# Patient Record
Sex: MALE | Race: WHITE | NOT HISPANIC OR LATINO | Employment: FULL TIME | URBAN - METROPOLITAN AREA
[De-identification: names, ages, dates, MRNs, and addresses within clinical notes are randomized per-mention and may not be internally consistent; named-entity substitution may affect disease eponyms.]

---

## 2023-03-09 DIAGNOSIS — I25.10 CORONARY ARTERY DISEASE INVOLVING NATIVE HEART, UNSPECIFIED VESSEL OR LESION TYPE, UNSPECIFIED WHETHER ANGINA PRESENT: Primary | ICD-10-CM

## 2023-03-09 RX ORDER — ATORVASTATIN CALCIUM 80 MG/1
TABLET, FILM COATED ORAL
COMMUNITY
End: 2023-03-09 | Stop reason: SDUPTHER

## 2023-03-09 RX ORDER — ATORVASTATIN CALCIUM 80 MG/1
80 TABLET, FILM COATED ORAL DAILY
Qty: 90 TABLET | Refills: 1 | Status: SHIPPED | OUTPATIENT
Start: 2023-03-09 | End: 2023-09-06

## 2023-03-09 RX ORDER — CLOPIDOGREL BISULFATE 75 MG/1
TABLET ORAL
COMMUNITY
End: 2023-03-09 | Stop reason: SDUPTHER

## 2023-03-09 RX ORDER — CLOPIDOGREL BISULFATE 75 MG/1
75 TABLET ORAL DAILY
Qty: 90 TABLET | Refills: 1 | Status: SHIPPED | OUTPATIENT
Start: 2023-03-09 | End: 2023-09-06

## 2023-05-04 ENCOUNTER — LAB (OUTPATIENT)
Dept: LAB | Facility: LAB | Age: 73
End: 2023-05-04
Payer: COMMERCIAL

## 2023-05-04 ENCOUNTER — OFFICE VISIT (OUTPATIENT)
Dept: PRIMARY CARE | Facility: CLINIC | Age: 73
End: 2023-05-04
Payer: COMMERCIAL

## 2023-05-04 VITALS
SYSTOLIC BLOOD PRESSURE: 124 MMHG | HEIGHT: 69 IN | WEIGHT: 197 LBS | BODY MASS INDEX: 29.18 KG/M2 | DIASTOLIC BLOOD PRESSURE: 80 MMHG

## 2023-05-04 DIAGNOSIS — I10 PRIMARY HYPERTENSION: ICD-10-CM

## 2023-05-04 DIAGNOSIS — Z00.00 HEALTHCARE MAINTENANCE: ICD-10-CM

## 2023-05-04 DIAGNOSIS — F41.9 ANXIETY: ICD-10-CM

## 2023-05-04 DIAGNOSIS — I25.10 CORONARY ARTERY DISEASE INVOLVING NATIVE CORONARY ARTERY OF NATIVE HEART WITHOUT ANGINA PECTORIS: ICD-10-CM

## 2023-05-04 DIAGNOSIS — E78.2 MIXED HYPERLIPIDEMIA: ICD-10-CM

## 2023-05-04 DIAGNOSIS — Z00.00 HEALTHCARE MAINTENANCE: Primary | ICD-10-CM

## 2023-05-04 DIAGNOSIS — Z00.00 ENCOUNTER FOR PREVENTIVE HEALTH EXAMINATION: ICD-10-CM

## 2023-05-04 DIAGNOSIS — Z00.00 HEALTH CARE MAINTENANCE: ICD-10-CM

## 2023-05-04 LAB
ALANINE AMINOTRANSFERASE (SGPT) (U/L) IN SER/PLAS: 29 U/L (ref 10–52)
ALBUMIN (G/DL) IN SER/PLAS: 4.5 G/DL (ref 3.4–5)
ALKALINE PHOSPHATASE (U/L) IN SER/PLAS: 84 U/L (ref 33–136)
ANION GAP IN SER/PLAS: 13 MMOL/L (ref 10–20)
ASPARTATE AMINOTRANSFERASE (SGOT) (U/L) IN SER/PLAS: 19 U/L (ref 9–39)
BILIRUBIN TOTAL (MG/DL) IN SER/PLAS: 0.8 MG/DL (ref 0–1.2)
CALCIUM (MG/DL) IN SER/PLAS: 10.2 MG/DL (ref 8.6–10.6)
CARBON DIOXIDE, TOTAL (MMOL/L) IN SER/PLAS: 30 MMOL/L (ref 21–32)
CHLORIDE (MMOL/L) IN SER/PLAS: 108 MMOL/L (ref 98–107)
CHOLESTEROL (MG/DL) IN SER/PLAS: 148 MG/DL (ref 0–199)
CHOLESTEROL IN HDL (MG/DL) IN SER/PLAS: 48.2 MG/DL
CHOLESTEROL/HDL RATIO: 3.1
CREATININE (MG/DL) IN SER/PLAS: 1.04 MG/DL (ref 0.5–1.3)
ERYTHROCYTE DISTRIBUTION WIDTH (RATIO) BY AUTOMATED COUNT: 12.7 % (ref 11.5–14.5)
ERYTHROCYTE MEAN CORPUSCULAR HEMOGLOBIN CONCENTRATION (G/DL) BY AUTOMATED: 33.1 G/DL (ref 32–36)
ERYTHROCYTE MEAN CORPUSCULAR VOLUME (FL) BY AUTOMATED COUNT: 92 FL (ref 80–100)
ERYTHROCYTES (10*6/UL) IN BLOOD BY AUTOMATED COUNT: 5.28 X10E12/L (ref 4.5–5.9)
ESTIMATED AVERAGE GLUCOSE FOR HBA1C: 108 MG/DL
GFR MALE: 76 ML/MIN/1.73M2
GLUCOSE (MG/DL) IN SER/PLAS: 79 MG/DL (ref 74–99)
HEMATOCRIT (%) IN BLOOD BY AUTOMATED COUNT: 48.4 % (ref 41–52)
HEMOGLOBIN (G/DL) IN BLOOD: 16 G/DL (ref 13.5–17.5)
HEMOGLOBIN A1C/HEMOGLOBIN TOTAL IN BLOOD: 5.4 %
LDL: 63 MG/DL (ref 0–99)
LEUKOCYTES (10*3/UL) IN BLOOD BY AUTOMATED COUNT: 6.5 X10E9/L (ref 4.4–11.3)
NRBC (PER 100 WBCS) BY AUTOMATED COUNT: 0 /100 WBC (ref 0–0)
PLATELETS (10*3/UL) IN BLOOD AUTOMATED COUNT: 254 X10E9/L (ref 150–450)
POTASSIUM (MMOL/L) IN SER/PLAS: 4.5 MMOL/L (ref 3.5–5.3)
PROTEIN TOTAL: 7.6 G/DL (ref 6.4–8.2)
SODIUM (MMOL/L) IN SER/PLAS: 146 MMOL/L (ref 136–145)
TRIGLYCERIDE (MG/DL) IN SER/PLAS: 183 MG/DL (ref 0–149)
UREA NITROGEN (MG/DL) IN SER/PLAS: 22 MG/DL (ref 6–23)
VLDL: 37 MG/DL (ref 0–40)

## 2023-05-04 PROCEDURE — 3074F SYST BP LT 130 MM HG: CPT | Performed by: STUDENT IN AN ORGANIZED HEALTH CARE EDUCATION/TRAINING PROGRAM

## 2023-05-04 PROCEDURE — 80061 LIPID PANEL: CPT

## 2023-05-04 PROCEDURE — 1159F MED LIST DOCD IN RCRD: CPT | Performed by: STUDENT IN AN ORGANIZED HEALTH CARE EDUCATION/TRAINING PROGRAM

## 2023-05-04 PROCEDURE — 36415 COLL VENOUS BLD VENIPUNCTURE: CPT

## 2023-05-04 PROCEDURE — 80053 COMPREHEN METABOLIC PANEL: CPT

## 2023-05-04 PROCEDURE — 1036F TOBACCO NON-USER: CPT | Performed by: STUDENT IN AN ORGANIZED HEALTH CARE EDUCATION/TRAINING PROGRAM

## 2023-05-04 PROCEDURE — 83036 HEMOGLOBIN GLYCOSYLATED A1C: CPT

## 2023-05-04 PROCEDURE — 99397 PER PM REEVAL EST PAT 65+ YR: CPT | Performed by: STUDENT IN AN ORGANIZED HEALTH CARE EDUCATION/TRAINING PROGRAM

## 2023-05-04 PROCEDURE — 3079F DIAST BP 80-89 MM HG: CPT | Performed by: STUDENT IN AN ORGANIZED HEALTH CARE EDUCATION/TRAINING PROGRAM

## 2023-05-04 PROCEDURE — 85027 COMPLETE CBC AUTOMATED: CPT

## 2023-05-04 RX ORDER — CALC/MAG/B COMPLEX/D3/HERB 61
15 TABLET ORAL DAILY
COMMUNITY
End: 2023-05-04

## 2023-05-04 RX ORDER — LOSARTAN POTASSIUM 100 MG/1
100 TABLET ORAL DAILY
COMMUNITY
Start: 2023-02-24 | End: 2023-11-27

## 2023-05-04 RX ORDER — NITROGLYCERIN 0.4 MG/1
0.4 TABLET SUBLINGUAL EVERY 5 MIN PRN
COMMUNITY
End: 2024-05-07 | Stop reason: SDUPTHER

## 2023-05-04 RX ORDER — DULOXETIN HYDROCHLORIDE 20 MG/1
20 CAPSULE, DELAYED RELEASE ORAL DAILY
COMMUNITY
End: 2023-05-04 | Stop reason: ALTCHOICE

## 2023-05-04 RX ORDER — MELATONIN 10 MG
10 CAPSULE ORAL NIGHTLY PRN
COMMUNITY

## 2023-05-04 RX ORDER — BUPROPION HYDROCHLORIDE 75 MG/1
75 TABLET ORAL 2 TIMES DAILY
COMMUNITY
Start: 2022-05-09 | End: 2023-06-26

## 2023-05-04 RX ORDER — VIT A/VIT C/VIT E/ZINC/COPPER 4296-226
CAPSULE ORAL
COMMUNITY

## 2023-05-04 RX ORDER — DOCUSATE SODIUM 100 MG/1
100 CAPSULE, LIQUID FILLED ORAL 2 TIMES DAILY PRN
COMMUNITY
Start: 2022-08-02

## 2023-05-04 RX ORDER — GABAPENTIN 100 MG/1
1 CAPSULE ORAL
COMMUNITY
Start: 2022-01-24 | End: 2023-05-30 | Stop reason: SDUPTHER

## 2023-05-04 RX ORDER — OMEPRAZOLE 20 MG/1
20 TABLET, DELAYED RELEASE ORAL DAILY
COMMUNITY

## 2023-05-04 NOTE — PROGRESS NOTES
"Subjective   Patient ID: Tuan Castro is a 72 y.o. male who presents for Annual Exam (CPE).    HPI   Routine fu. Physical.    His wife has been in and out of the hospital after cardiac surgery, is still in CICU at [facility]. He is hoping for acute rehab placement for her following hospitalization. This has understandably been difficult on Cristofer.    Patient feels well physically. His mood has been surprisingly OK given health struggles of his wife. He is trying to maintain good outlook.  Review of Systems  12-point ROS reviewed and was negative unless otherwise noted in HPI.    Objective   /80   Ht 1.753 m (5' 9\")   Wt 89.4 kg (197 lb)   BMI 29.09 kg/m²     Physical Exam  GEN: conversant, NAD  HEENT: PERRL, EOMI, wearing a mask  NECK: supple, no carotid bruits appreciated b/l  CV: S1, S2, RRR  PULM: CTAB  ABD: soft, NT, ND  NEURO: no new gross focal deficits  EXT: no sig LE edema  PSYCH: appropriate affect    Assessment/Plan       #Depression/anxiety: no longer taking bupropion or duloxetine, mood stable.     #Paroxysmal SVT: seen on recent Holter monitor, ablation, seeing cardiology     #CAD  s/p FENG, is having no cardiac symptoms, currently on DAPT per cardiology, though he his holding Plavix prior to surgery     # HTN:   -On losartan 25 mg daily     # HLD:  -CACS 248 in 2020  -Continue atorvastatin  -encouraged diet and exercise     # Misc:  - Tinnitus  - Sensorineural hearing loss  - hx partial colectomy, follows with surgeon   - h/o reported brain bleed ~2010, no current issues     # HM:  - Vaccines: Prevnar 13 given in fall 2016, Pneumovax 23 given in 2017, Shingrix (2019), TDaP (2019), COVID UTD  - Normal colonoscopy 10/2018, follows with GI     RTC in 3 months    Post visit: patient's wife Estelita passed away.     "

## 2023-05-30 DIAGNOSIS — F41.9 ANXIETY: Primary | ICD-10-CM

## 2023-05-30 RX ORDER — GABAPENTIN 100 MG/1
100 CAPSULE ORAL 3 TIMES DAILY
Qty: 90 CAPSULE | Refills: 3 | Status: SHIPPED | OUTPATIENT
Start: 2023-05-30 | End: 2023-06-26 | Stop reason: SDUPTHER

## 2023-06-26 ENCOUNTER — OFFICE VISIT (OUTPATIENT)
Dept: PRIMARY CARE | Facility: CLINIC | Age: 73
End: 2023-06-26
Payer: COMMERCIAL

## 2023-06-26 VITALS — DIASTOLIC BLOOD PRESSURE: 76 MMHG | SYSTOLIC BLOOD PRESSURE: 122 MMHG

## 2023-06-26 DIAGNOSIS — F43.21 GRIEF: ICD-10-CM

## 2023-06-26 DIAGNOSIS — F32.9 MAJOR DEPRESSIVE DISORDER, REMISSION STATUS UNSPECIFIED, UNSPECIFIED WHETHER RECURRENT: Primary | ICD-10-CM

## 2023-06-26 DIAGNOSIS — F41.9 ANXIETY: ICD-10-CM

## 2023-06-26 DIAGNOSIS — G25.81 RESTLESS LEG SYNDROME: ICD-10-CM

## 2023-06-26 DIAGNOSIS — I10 ESSENTIAL HYPERTENSION: ICD-10-CM

## 2023-06-26 PROBLEM — R00.0 TACHYCARDIA: Status: ACTIVE | Noted: 2023-06-26

## 2023-06-26 PROBLEM — J34.89 NASAL DRAINAGE: Status: ACTIVE | Noted: 2023-06-26

## 2023-06-26 PROBLEM — H90.3 SENSORINEURAL HEARING LOSS, BILATERAL: Status: ACTIVE | Noted: 2023-06-26

## 2023-06-26 PROBLEM — Z22.321 SUSPECTED CARRIER OF METHICILLIN SUSCEPTIBLE STAPHYLOCOCCUS AUREUS (MSSA): Status: ACTIVE | Noted: 2023-06-26

## 2023-06-26 PROBLEM — K57.92 DIVERTICULITIS: Status: ACTIVE | Noted: 2023-06-26

## 2023-06-26 PROBLEM — F68.8 PERSONALITY CHANGE: Status: ACTIVE | Noted: 2023-06-26

## 2023-06-26 PROBLEM — H93.12 TINNITUS, SUBJECTIVE, LEFT: Status: ACTIVE | Noted: 2023-06-26

## 2023-06-26 PROBLEM — N20.0 NEPHROLITHIASIS: Status: ACTIVE | Noted: 2023-06-26

## 2023-06-26 PROBLEM — R07.89 CHEST DISCOMFORT: Status: ACTIVE | Noted: 2023-06-26

## 2023-06-26 PROBLEM — D12.6 TUBULAR ADENOMA OF COLON: Status: ACTIVE | Noted: 2023-06-26

## 2023-06-26 PROBLEM — R00.1 BRADYCARDIA: Status: ACTIVE | Noted: 2023-06-26

## 2023-06-26 PROBLEM — T22.191A: Status: ACTIVE | Noted: 2019-08-21

## 2023-06-26 PROBLEM — R01.1 HEART MURMUR: Status: ACTIVE | Noted: 2023-06-26

## 2023-06-26 PROBLEM — E78.00 PURE HYPERCHOLESTEROLEMIA: Status: ACTIVE | Noted: 2023-06-26

## 2023-06-26 PROBLEM — S83.411A SPRAIN OF MEDIAL COLLATERAL LIGAMENT OF RIGHT KNEE: Status: ACTIVE | Noted: 2023-06-26

## 2023-06-26 PROBLEM — Z95.5 PRESENCE OF STENT IN CORONARY ARTERY: Status: ACTIVE | Noted: 2023-06-26

## 2023-06-26 PROBLEM — M25.511 RIGHT SHOULDER PAIN: Status: ACTIVE | Noted: 2023-06-26

## 2023-06-26 PROBLEM — T21.19XA: Status: ACTIVE | Noted: 2023-06-26

## 2023-06-26 PROBLEM — M17.12 ARTHRITIS OF KNEE, LEFT: Status: ACTIVE | Noted: 2023-06-26

## 2023-06-26 PROBLEM — M17.11 ARTHRITIS OF KNEE, RIGHT: Status: ACTIVE | Noted: 2023-06-26

## 2023-06-26 PROBLEM — D36.9 TUBULOVILLOUS ADENOMA: Status: ACTIVE | Noted: 2023-06-26

## 2023-06-26 PROBLEM — Z90.49 S/P LAPAROSCOPIC-ASSISTED SIGMOIDECTOMY: Status: ACTIVE | Noted: 2023-06-26

## 2023-06-26 PROBLEM — R60.9 EDEMA: Status: ACTIVE | Noted: 2023-06-26

## 2023-06-26 PROBLEM — F32.A DEPRESSION: Status: ACTIVE | Noted: 2023-06-26

## 2023-06-26 PROBLEM — I61.9 BRAIN BLEED (MULTI): Status: ACTIVE | Noted: 2023-06-26

## 2023-06-26 PROBLEM — M25.562 KNEE PAIN, LEFT: Status: ACTIVE | Noted: 2023-06-26

## 2023-06-26 PROBLEM — M17.11 OSTEOARTHRITIS OF RIGHT KNEE: Status: ACTIVE | Noted: 2023-06-26

## 2023-06-26 PROBLEM — I25.10 CORONARY ARTERY DISEASE: Status: ACTIVE | Noted: 2023-06-26

## 2023-06-26 PROBLEM — Z90.49 H/O RESECTION OF LARGE BOWEL: Status: ACTIVE | Noted: 2023-06-26

## 2023-06-26 PROBLEM — T20.20XA SECOND DEGREE BURN OF FACE: Status: ACTIVE | Noted: 2019-08-21

## 2023-06-26 PROBLEM — F51.5 NIGHTMARES: Status: ACTIVE | Noted: 2023-06-26

## 2023-06-26 PROBLEM — M25.561 RIGHT KNEE PAIN: Status: ACTIVE | Noted: 2023-06-26

## 2023-06-26 PROBLEM — R07.0 PAIN IN THROAT: Status: ACTIVE | Noted: 2018-09-13

## 2023-06-26 PROBLEM — M25.661 DECREASED RANGE OF MOTION OF RIGHT KNEE: Status: ACTIVE | Noted: 2023-06-26

## 2023-06-26 PROBLEM — I47.10 PSVT (PAROXYSMAL SUPRAVENTRICULAR TACHYCARDIA) (CMS-HCC): Status: ACTIVE | Noted: 2023-06-26

## 2023-06-26 PROBLEM — E78.1 HYPERTRIGLYCERIDEMIA: Status: ACTIVE | Noted: 2023-06-26

## 2023-06-26 PROBLEM — M54.2 NECK PAIN: Status: ACTIVE | Noted: 2023-06-26

## 2023-06-26 PROBLEM — Z96.651 PRESENCE OF RIGHT ARTIFICIAL KNEE JOINT: Status: ACTIVE | Noted: 2022-08-01

## 2023-06-26 PROBLEM — R22.9 SUBCUTANEOUS NODULE: Status: ACTIVE | Noted: 2023-06-26

## 2023-06-26 PROBLEM — G47.00 INSOMNIA: Status: ACTIVE | Noted: 2023-06-26

## 2023-06-26 PROBLEM — J01.90 ACUTE SINUSITIS: Status: ACTIVE | Noted: 2023-06-26

## 2023-06-26 PROBLEM — Z96.652 STATUS POST LEFT KNEE REPLACEMENT: Status: ACTIVE | Noted: 2023-06-26

## 2023-06-26 PROBLEM — R09.81 NASAL CONGESTION: Status: ACTIVE | Noted: 2023-06-26

## 2023-06-26 PROBLEM — R05.8 DRY COUGH: Status: ACTIVE | Noted: 2023-06-26

## 2023-06-26 PROBLEM — K43.2 VENTRAL INCISIONAL HERNIA: Status: ACTIVE | Noted: 2023-06-26

## 2023-06-26 PROBLEM — R29.898 WEAKNESS OF RIGHT LOWER EXTREMITY: Status: ACTIVE | Noted: 2023-06-26

## 2023-06-26 PROBLEM — H61.23 BILATERAL IMPACTED CERUMEN: Status: ACTIVE | Noted: 2023-06-26

## 2023-06-26 PROCEDURE — 3074F SYST BP LT 130 MM HG: CPT | Performed by: STUDENT IN AN ORGANIZED HEALTH CARE EDUCATION/TRAINING PROGRAM

## 2023-06-26 PROCEDURE — 99214 OFFICE O/P EST MOD 30 MIN: CPT | Performed by: STUDENT IN AN ORGANIZED HEALTH CARE EDUCATION/TRAINING PROGRAM

## 2023-06-26 PROCEDURE — 1036F TOBACCO NON-USER: CPT | Performed by: STUDENT IN AN ORGANIZED HEALTH CARE EDUCATION/TRAINING PROGRAM

## 2023-06-26 PROCEDURE — 3078F DIAST BP <80 MM HG: CPT | Performed by: STUDENT IN AN ORGANIZED HEALTH CARE EDUCATION/TRAINING PROGRAM

## 2023-06-26 PROCEDURE — 1160F RVW MEDS BY RX/DR IN RCRD: CPT | Performed by: STUDENT IN AN ORGANIZED HEALTH CARE EDUCATION/TRAINING PROGRAM

## 2023-06-26 PROCEDURE — 1159F MED LIST DOCD IN RCRD: CPT | Performed by: STUDENT IN AN ORGANIZED HEALTH CARE EDUCATION/TRAINING PROGRAM

## 2023-06-26 RX ORDER — DULOXETIN HYDROCHLORIDE 20 MG/1
20 CAPSULE, DELAYED RELEASE ORAL DAILY
Qty: 90 CAPSULE | Refills: 0 | Status: SHIPPED | OUTPATIENT
Start: 2023-06-26 | End: 2023-07-06 | Stop reason: SDUPTHER

## 2023-06-26 RX ORDER — DULOXETIN HYDROCHLORIDE 20 MG/1
20 CAPSULE, DELAYED RELEASE ORAL DAILY
Qty: 90 CAPSULE | Refills: 0 | Status: SHIPPED | OUTPATIENT
Start: 2023-06-26 | End: 2023-06-26 | Stop reason: SDUPTHER

## 2023-06-26 RX ORDER — GABAPENTIN 100 MG/1
100 CAPSULE ORAL 3 TIMES DAILY
Qty: 270 CAPSULE | Refills: 0 | Status: SHIPPED | OUTPATIENT
Start: 2023-06-26 | End: 2023-12-05 | Stop reason: SDUPTHER

## 2023-06-26 RX ORDER — BUPROPION HYDROCHLORIDE 75 MG/1
75 TABLET ORAL 2 TIMES DAILY
Qty: 180 TABLET | Refills: 0 | Status: SHIPPED | OUTPATIENT
Start: 2023-06-26 | End: 2023-07-06 | Stop reason: SDUPTHER

## 2023-06-26 RX ORDER — MULTIVITAMIN
TABLET ORAL
COMMUNITY
Start: 2022-08-03

## 2023-06-26 RX ORDER — BUPROPION HYDROCHLORIDE 75 MG/1
75 TABLET ORAL 2 TIMES DAILY
Qty: 180 TABLET | Refills: 0 | Status: SHIPPED | OUTPATIENT
Start: 2023-06-26 | End: 2023-06-26 | Stop reason: SDUPTHER

## 2023-06-26 RX ORDER — ACETAMINOPHEN 500 MG
TABLET ORAL
COMMUNITY
Start: 2022-08-03

## 2023-06-26 ASSESSMENT — ENCOUNTER SYMPTOMS
SLEEP DISTURBANCE: 1
CARDIOVASCULAR NEGATIVE: 1
GASTROINTESTINAL NEGATIVE: 1
NEUROLOGICAL NEGATIVE: 1
HYPERACTIVE: 0
RESPIRATORY NEGATIVE: 1
MUSCULOSKELETAL NEGATIVE: 1
NERVOUS/ANXIOUS: 1
CONSTITUTIONAL NEGATIVE: 1

## 2023-06-26 NOTE — PROGRESS NOTES
Subjective   Patient ID: Tuan Castro is a 72 y.o. male who presents for Depression (Requesting new med).    Patient seen on follow-up and sick visit.  Has been going through a lot as of late, recently had his wife passed away in the hospital.  States that he is coping well initially with this, and was discussed to be on medications by his PCP.  He deferred at this time, however reports that he would like to be on pharmacological therapy.  He did tolerate duloxetine and Wellbutrin in the past.  States that he had good effects with this.  Does have a good support system, denies any SI.  Is having issues with insomnia as well 2.  Otherwise, reports no complaints or concerns.         Review of Systems   Constitutional: Negative.    HENT: Negative.     Respiratory: Negative.     Cardiovascular: Negative.    Gastrointestinal: Negative.    Musculoskeletal: Negative.    Skin: Negative.    Neurological: Negative.    Psychiatric/Behavioral:  Positive for behavioral problems and sleep disturbance. Negative for self-injury and suicidal ideas. The patient is nervous/anxious. The patient is not hyperactive.        Objective   /76     Physical Exam  Vitals and nursing note reviewed.   Constitutional:       General: He is not in acute distress.     Appearance: Normal appearance.   HENT:      Mouth/Throat:      Mouth: Mucous membranes are moist.      Pharynx: Oropharynx is clear. No oropharyngeal exudate.   Eyes:      Extraocular Movements: Extraocular movements intact.      Pupils: Pupils are equal, round, and reactive to light.   Cardiovascular:      Rate and Rhythm: Normal rate and regular rhythm.      Pulses: Normal pulses.      Heart sounds: Normal heart sounds. No murmur heard.  Pulmonary:      Effort: Pulmonary effort is normal. No respiratory distress.   Abdominal:      General: Abdomen is flat. Bowel sounds are normal. There is no distension.      Tenderness: There is no abdominal tenderness.    Musculoskeletal:         General: Normal range of motion.      Right lower leg: No edema.      Left lower leg: No edema.   Skin:     General: Skin is warm and dry.      Capillary Refill: Capillary refill takes less than 2 seconds.   Neurological:      General: No focal deficit present.      Mental Status: He is alert. Mental status is at baseline.      Cranial Nerves: No cranial nerve deficit.      Motor: No weakness.   Psychiatric:         Mood and Affect: Mood normal.         Thought Content: Thought content normal.      Comments: Tearful at times         Assessment/Plan   Problem List Items Addressed This Visit          Other    Depression - Primary     Other Visit Diagnoses       Grief            Patient seen on follow-up and sick visit     #Depression/anxiety  #Appropriate grief  Patient recently had wife passed away, previously was on Cymbalta and Wellbutrin, states that this regimen worked well for him, advised initiation of duloxetine then initiation of Wellbutrin a week or so after.  To help avoid side effects.  Advised continued support system, he will call if symptoms worsen or cannot tolerate the medications or if we need titration  Advise follow-up with psychiatry for cognitive behavioral therapy if symptoms persist and he is agreeable with this plan     #Paroxysmal SVT: seen on recent Holter monitor, ablation, seeing cardiology     #CAD  s/p FENG, is having no cardiac symptoms, currently on DAPT per cardiology, though he his holding Plavix prior to surgery     # HTN:   -On losartan 25 mg daily     # HLD:  -CACS 248 in 2020  -Continue atorvastatin  -encouraged diet and exercise     # Misc:  - Tinnitus  - Sensorineural hearing loss  - hx partial colectomy, follows with surgeon   - h/o reported brain bleed ~2010, no current issues     # HM:  - Vaccines: Prevnar 13 given in fall 2016, Pneumovax 23 given in 2017, Shingrix (2019), TDaP (2019), COVID UTD  - Normal colonoscopy 10/2018, follows with GI     RTC  in 3 months

## 2023-07-05 ENCOUNTER — TELEPHONE (OUTPATIENT)
Dept: PRIMARY CARE | Facility: CLINIC | Age: 73
End: 2023-07-05
Payer: COMMERCIAL

## 2023-07-06 DIAGNOSIS — F43.21 GRIEF: ICD-10-CM

## 2023-07-06 DIAGNOSIS — F32.9 MAJOR DEPRESSIVE DISORDER, REMISSION STATUS UNSPECIFIED, UNSPECIFIED WHETHER RECURRENT: ICD-10-CM

## 2023-07-06 DIAGNOSIS — G47.00 INSOMNIA, UNSPECIFIED TYPE: Primary | ICD-10-CM

## 2023-07-06 RX ORDER — BUPROPION HYDROCHLORIDE 75 MG/1
75 TABLET ORAL 2 TIMES DAILY
Qty: 180 TABLET | Refills: 0 | Status: SHIPPED | OUTPATIENT
Start: 2023-07-06 | End: 2024-01-02

## 2023-07-06 RX ORDER — DULOXETIN HYDROCHLORIDE 20 MG/1
40 CAPSULE, DELAYED RELEASE ORAL DAILY
Qty: 180 CAPSULE | Refills: 0
Start: 2023-07-06 | End: 2023-09-19 | Stop reason: ALTCHOICE

## 2023-08-22 ENCOUNTER — TELEMEDICINE (OUTPATIENT)
Dept: PRIMARY CARE | Facility: CLINIC | Age: 73
End: 2023-08-22
Payer: COMMERCIAL

## 2023-08-22 DIAGNOSIS — J40 BRONCHITIS: Primary | ICD-10-CM

## 2023-08-22 DIAGNOSIS — F32.A DEPRESSION, UNSPECIFIED DEPRESSION TYPE: ICD-10-CM

## 2023-08-22 PROCEDURE — 99213 OFFICE O/P EST LOW 20 MIN: CPT | Performed by: STUDENT IN AN ORGANIZED HEALTH CARE EDUCATION/TRAINING PROGRAM

## 2023-08-22 RX ORDER — AMOXICILLIN AND CLAVULANATE POTASSIUM 875; 125 MG/1; MG/1
875 TABLET, FILM COATED ORAL 2 TIMES DAILY
Qty: 14 TABLET | Refills: 0 | Status: SHIPPED | OUTPATIENT
Start: 2023-08-22 | End: 2023-08-29

## 2023-08-22 NOTE — PROGRESS NOTES
Subjective   Patient ID: Tuan Castro is a 72 y.o. male who presents for URI symptoms.    HPI   Sick virtual visit.    He started having cough, some sore throat, rhinorrhea about 10 days ago. He has tested negative for COVID. Symptoms persist, cough is worsening, has raspy voice, some mild SOB. No fevers.  Review of Systems  12-point ROS reviewed and was negative unless otherwise noted in HPI.    Objective   There were no vitals taken for this visit.    Physical Exam  Physical exam omitted, as this was a virtual visit.  Assessment/Plan     #Acute bronchitis: start course of Augmentin. He will let us know if symptoms persist or worsen. Maintain adequate hydration.    #Depression: he stopped taking duloxetine due to heart racing. He did not start taking Wellbutrin. He says mood is stable, has been grieving the loss of his wife, but overall feels better than 2 months ago.    RTC as previously scheduled

## 2023-08-28 ENCOUNTER — TELEPHONE (OUTPATIENT)
Dept: PRIMARY CARE | Facility: CLINIC | Age: 73
End: 2023-08-28
Payer: COMMERCIAL

## 2023-08-28 DIAGNOSIS — J40 BRONCHITIS: Primary | ICD-10-CM

## 2023-09-06 DIAGNOSIS — I25.10 CORONARY ARTERY DISEASE INVOLVING NATIVE HEART, UNSPECIFIED VESSEL OR LESION TYPE, UNSPECIFIED WHETHER ANGINA PRESENT: ICD-10-CM

## 2023-09-06 RX ORDER — ATORVASTATIN CALCIUM 80 MG/1
80 TABLET, FILM COATED ORAL DAILY
Qty: 90 TABLET | Refills: 0 | Status: SHIPPED | OUTPATIENT
Start: 2023-09-06 | End: 2023-12-05 | Stop reason: SDUPTHER

## 2023-09-06 RX ORDER — CLOPIDOGREL BISULFATE 75 MG/1
75 TABLET ORAL DAILY
Qty: 90 TABLET | Refills: 0 | Status: SHIPPED | OUTPATIENT
Start: 2023-09-06 | End: 2023-12-05 | Stop reason: SDUPTHER

## 2023-09-19 ENCOUNTER — TELEPHONE (OUTPATIENT)
Dept: PRIMARY CARE | Facility: CLINIC | Age: 73
End: 2023-09-19
Payer: COMMERCIAL

## 2023-09-19 DIAGNOSIS — F32.A DEPRESSION, UNSPECIFIED DEPRESSION TYPE: Primary | ICD-10-CM

## 2023-09-19 RX ORDER — ESCITALOPRAM OXALATE 10 MG/1
10 TABLET ORAL DAILY
Qty: 30 TABLET | Refills: 2 | Status: SHIPPED | OUTPATIENT
Start: 2023-09-19 | End: 2023-10-17

## 2023-09-19 NOTE — TELEPHONE ENCOUNTER
Please have him stop taking the duloxetine. I sent a new Rx for escitalopram (Lexapro) that he can start taking once daily. Thank you.

## 2023-10-17 DIAGNOSIS — F32.A DEPRESSION, UNSPECIFIED DEPRESSION TYPE: ICD-10-CM

## 2023-10-17 RX ORDER — ESCITALOPRAM OXALATE 10 MG/1
10 TABLET ORAL DAILY
Qty: 90 TABLET | Refills: 0 | Status: SHIPPED | OUTPATIENT
Start: 2023-10-17 | End: 2023-12-05 | Stop reason: SDUPTHER

## 2023-11-02 ENCOUNTER — APPOINTMENT (OUTPATIENT)
Dept: PRIMARY CARE | Facility: CLINIC | Age: 73
End: 2023-11-02
Payer: COMMERCIAL

## 2023-11-24 DIAGNOSIS — I10 ESSENTIAL (PRIMARY) HYPERTENSION: ICD-10-CM

## 2023-11-27 RX ORDER — LOSARTAN POTASSIUM 100 MG/1
100 TABLET ORAL DAILY
Qty: 90 TABLET | Refills: 3 | Status: SHIPPED | OUTPATIENT
Start: 2023-11-27 | End: 2023-12-05 | Stop reason: SDUPTHER

## 2023-12-04 DIAGNOSIS — I25.10 CORONARY ARTERY DISEASE INVOLVING NATIVE HEART, UNSPECIFIED VESSEL OR LESION TYPE, UNSPECIFIED WHETHER ANGINA PRESENT: ICD-10-CM

## 2023-12-05 ENCOUNTER — OFFICE VISIT (OUTPATIENT)
Dept: PRIMARY CARE | Facility: CLINIC | Age: 73
End: 2023-12-05
Payer: COMMERCIAL

## 2023-12-05 VITALS
HEIGHT: 70 IN | BODY MASS INDEX: 28.63 KG/M2 | SYSTOLIC BLOOD PRESSURE: 126 MMHG | WEIGHT: 200 LBS | DIASTOLIC BLOOD PRESSURE: 72 MMHG

## 2023-12-05 DIAGNOSIS — I25.10 CORONARY ARTERY DISEASE INVOLVING NATIVE HEART, UNSPECIFIED VESSEL OR LESION TYPE, UNSPECIFIED WHETHER ANGINA PRESENT: ICD-10-CM

## 2023-12-05 DIAGNOSIS — I25.10 CORONARY ARTERY DISEASE INVOLVING NATIVE CORONARY ARTERY OF NATIVE HEART WITHOUT ANGINA PECTORIS: ICD-10-CM

## 2023-12-05 DIAGNOSIS — G25.81 RESTLESS LEG SYNDROME: ICD-10-CM

## 2023-12-05 DIAGNOSIS — I10 ESSENTIAL (PRIMARY) HYPERTENSION: ICD-10-CM

## 2023-12-05 DIAGNOSIS — F32.A DEPRESSION, UNSPECIFIED DEPRESSION TYPE: Primary | ICD-10-CM

## 2023-12-05 DIAGNOSIS — E78.2 MIXED HYPERLIPIDEMIA: ICD-10-CM

## 2023-12-05 DIAGNOSIS — I10 ESSENTIAL HYPERTENSION: ICD-10-CM

## 2023-12-05 DIAGNOSIS — F41.9 ANXIETY: ICD-10-CM

## 2023-12-05 DIAGNOSIS — Z00.00 HEALTH CARE MAINTENANCE: ICD-10-CM

## 2023-12-05 PROCEDURE — 99214 OFFICE O/P EST MOD 30 MIN: CPT | Performed by: STUDENT IN AN ORGANIZED HEALTH CARE EDUCATION/TRAINING PROGRAM

## 2023-12-05 PROCEDURE — 1159F MED LIST DOCD IN RCRD: CPT | Performed by: STUDENT IN AN ORGANIZED HEALTH CARE EDUCATION/TRAINING PROGRAM

## 2023-12-05 PROCEDURE — 3078F DIAST BP <80 MM HG: CPT | Performed by: STUDENT IN AN ORGANIZED HEALTH CARE EDUCATION/TRAINING PROGRAM

## 2023-12-05 PROCEDURE — 1160F RVW MEDS BY RX/DR IN RCRD: CPT | Performed by: STUDENT IN AN ORGANIZED HEALTH CARE EDUCATION/TRAINING PROGRAM

## 2023-12-05 PROCEDURE — 3074F SYST BP LT 130 MM HG: CPT | Performed by: STUDENT IN AN ORGANIZED HEALTH CARE EDUCATION/TRAINING PROGRAM

## 2023-12-05 PROCEDURE — 1036F TOBACCO NON-USER: CPT | Performed by: STUDENT IN AN ORGANIZED HEALTH CARE EDUCATION/TRAINING PROGRAM

## 2023-12-05 RX ORDER — LOSARTAN POTASSIUM 100 MG/1
100 TABLET ORAL DAILY
Qty: 90 TABLET | Refills: 1 | Status: SHIPPED | OUTPATIENT
Start: 2023-12-05

## 2023-12-05 RX ORDER — CLOPIDOGREL BISULFATE 75 MG/1
75 TABLET ORAL DAILY
Qty: 90 TABLET | Refills: 1 | Status: SHIPPED | OUTPATIENT
Start: 2023-12-05 | End: 2024-05-28

## 2023-12-05 RX ORDER — ATORVASTATIN CALCIUM 80 MG/1
80 TABLET, FILM COATED ORAL DAILY
Qty: 90 TABLET | Refills: 1 | Status: SHIPPED | OUTPATIENT
Start: 2023-12-05 | End: 2024-05-28

## 2023-12-05 RX ORDER — ESCITALOPRAM OXALATE 10 MG/1
10 TABLET ORAL DAILY
Qty: 90 TABLET | Refills: 1 | Status: SHIPPED | OUTPATIENT
Start: 2023-12-05 | End: 2024-01-23 | Stop reason: SDUPTHER

## 2023-12-05 RX ORDER — GABAPENTIN 100 MG/1
100 CAPSULE ORAL 3 TIMES DAILY
Qty: 270 CAPSULE | Refills: 1 | Status: SHIPPED | OUTPATIENT
Start: 2023-12-05 | End: 2024-06-02

## 2023-12-05 NOTE — PROGRESS NOTES
"Subjective   Patient ID: Tuan Castro is a 73 y.o. male who presents for Follow-up.    HPI   Routine fu.    Patient says he feels overwhelmed.    Patient's wife Estelita passed away this year, this has been difficult on patient.    His son-in-law is sick in hospital. His brother was recently hospitalized for suicide attempt.    He is still working about 40 hours/week.    He is trying to obtain guardianship for his handicapped sister-in-law.    Patient denies SI/HI.  Review of Systems  12-point ROS reviewed and was negative unless otherwise noted in HPI.    Objective   Ht 1.778 m (5' 10\")   Wt 90.7 kg (200 lb)   BMI 28.70 kg/m²     Physical Exam  GEN: conversant, NAD  HEENT: PERRL, EOMI, MMM  NECK: supple, no carotid bruits appreciated b/l  CV: S1, S2, RRR  PULM: CTAB  ABD: soft, NT, ND  NEURO: no new gross focal deficits  EXT: no sig LE edema  PSYCH: appropriate affect    Assessment/Plan     #Depression/anxiety: no longer taking bupropion or duloxetine. Start trial of escitalopram, discussed SE profile. Offered referral to psychiatry or psychology, patient declines.     #Paroxysmal SVT: seen on recent Holter monitor, ablation, seeing cardiology     #CAD  s/p FENG, is having no cardiac symptoms, currently on DAPT per cardiology, though he his holding Plavix prior to surgery     # HTN:   -On losartan 25 mg daily     # HLD:  -CACS 248 in 2020  -Continue atorvastatin  -encouraged diet and exercise     # Misc:  - Tinnitus  - Sensorineural hearing loss  - hx partial colectomy, follows with surgeon   - h/o reported brain bleed ~2010, no current issues     # HM:  - Vaccines: Prevnar 13 given in fall 2016, Pneumovax 23 given in 2017, Shingrix (2019), TDaP (2019), COVID UTD  - Normal colonoscopy 10/2018, follows with GI     RTC in 3 months     "

## 2023-12-06 RX ORDER — ATORVASTATIN CALCIUM 80 MG/1
80 TABLET, FILM COATED ORAL DAILY
Qty: 90 TABLET | Refills: 0 | OUTPATIENT
Start: 2023-12-06

## 2023-12-06 RX ORDER — CLOPIDOGREL BISULFATE 75 MG/1
75 TABLET ORAL DAILY
Qty: 90 TABLET | Refills: 0 | OUTPATIENT
Start: 2023-12-06

## 2023-12-14 PROBLEM — E78.5 HYPERLIPIDEMIA: Status: ACTIVE | Noted: 2023-06-26

## 2023-12-14 PROBLEM — M19.011 ARTHRITIS OF RIGHT SHOULDER REGION: Status: ACTIVE | Noted: 2023-12-14

## 2023-12-14 PROBLEM — R00.0 TACHYCARDIA: Status: RESOLVED | Noted: 2023-06-26 | Resolved: 2023-12-14

## 2023-12-14 PROBLEM — I83.93 VARICOSE VEINS OF BOTH LOWER EXTREMITIES: Status: ACTIVE | Noted: 2023-12-14

## 2023-12-15 ENCOUNTER — OFFICE VISIT (OUTPATIENT)
Dept: CARDIOLOGY | Facility: CLINIC | Age: 73
End: 2023-12-15
Payer: COMMERCIAL

## 2023-12-15 VITALS
DIASTOLIC BLOOD PRESSURE: 76 MMHG | HEART RATE: 73 BPM | OXYGEN SATURATION: 96 % | BODY MASS INDEX: 23.66 KG/M2 | HEIGHT: 71 IN | SYSTOLIC BLOOD PRESSURE: 126 MMHG | WEIGHT: 169 LBS

## 2023-12-15 DIAGNOSIS — E78.49 OTHER HYPERLIPIDEMIA: ICD-10-CM

## 2023-12-15 DIAGNOSIS — I10 ESSENTIAL HYPERTENSION: ICD-10-CM

## 2023-12-15 DIAGNOSIS — I47.10 PSVT (PAROXYSMAL SUPRAVENTRICULAR TACHYCARDIA) (CMS-HCC): ICD-10-CM

## 2023-12-15 DIAGNOSIS — I25.10 CORONARY ARTERY DISEASE INVOLVING NATIVE CORONARY ARTERY OF NATIVE HEART WITHOUT ANGINA PECTORIS: Primary | ICD-10-CM

## 2023-12-15 PROCEDURE — 1160F RVW MEDS BY RX/DR IN RCRD: CPT | Performed by: INTERNAL MEDICINE

## 2023-12-15 PROCEDURE — 99214 OFFICE O/P EST MOD 30 MIN: CPT | Performed by: INTERNAL MEDICINE

## 2023-12-15 PROCEDURE — 1036F TOBACCO NON-USER: CPT | Performed by: INTERNAL MEDICINE

## 2023-12-15 PROCEDURE — 3078F DIAST BP <80 MM HG: CPT | Performed by: INTERNAL MEDICINE

## 2023-12-15 PROCEDURE — 1159F MED LIST DOCD IN RCRD: CPT | Performed by: INTERNAL MEDICINE

## 2023-12-15 PROCEDURE — 1126F AMNT PAIN NOTED NONE PRSNT: CPT | Performed by: INTERNAL MEDICINE

## 2023-12-15 PROCEDURE — 3074F SYST BP LT 130 MM HG: CPT | Performed by: INTERNAL MEDICINE

## 2023-12-15 RX ORDER — LACTULOSE 10 G/15ML
20 SOLUTION ORAL; RECTAL DAILY
COMMUNITY

## 2023-12-15 RX ORDER — INFLUENZA A VIRUS A/MICHIGAN/45/2015 X-275 (H1N1) ANTIGEN (FORMALDEHYDE INACTIVATED), INFLUENZA A VIRUS A/SINGAPORE/INFIMH-16-0019/2016 IVR-186 (H3N2) ANTIGEN (FORMALDEHYDE INACTIVATED), INFLUENZA B VIRUS B/PHUKET/3073/2013 ANTIGEN (FORMALDEHYDE INACTIVATED), AND INFLUENZA B VIRUS B/MARYLAND/15/2016 BX-69A ANTIGEN (FORMALDEHYDE INACTIVATED) 60; 60; 60; 60 UG/.7ML; UG/.7ML; UG/.7ML; UG/.7ML
0.7 INJECTION, SUSPENSION INTRAMUSCULAR ONCE
COMMUNITY

## 2023-12-15 ASSESSMENT — PAIN SCALES - GENERAL: PAINLEVEL: 0-NO PAIN

## 2023-12-15 NOTE — LETTER
December 15, 2023       No Recipients    Patient: Tuan Castro   YOB: 1950   Date of Visit: 12/15/2023       Dear Dr. Maurer Recipients:    Thank you for referring Tuan Castro to me for evaluation. Below are my notes for this consultation.  If you have questions, please do not hesitate to call me. I look forward to following your patient along with you.       Sincerely,     Gerson Rosario MD      CC:   No Recipients  ______________________________________________________________________________________        [facility] [place] [facility] Outpatient Follow-up Visit     Reason for Visit: CAD    HPI: Tuan Castro is a 73 y.o.  male who presents today for followup.     Patient is a 73-year-old male with a history of CAD, hypertension, dyslipidemia who initially presented with chest discomfort April 2021. EKG demonstrated normal sinus rhythm with reported dynamic changes in leads V1 through V3. Cardiac enzymes were negative. Cardiac CT demonstrated calcium score 248. Echocardiogram demonstrated ejection fraction 60 to 65%. He underwent cardiac catheterization which demonstrated mild CAD with a 70 to 80% stenosis in the mid to distal left posterior descending artery. He was medically managed. He had 1 or 2 other visits to the emergency department with lightheaded and chest discomfort. He underwent revascularization of the PDA. He admits to an occasional small twinge of chest discomfort. He denies any shortness of breath. He denies any syncope, orthopnea, PND. He does admit to varicose veins and some dependent lower extremity edema. He lost his wife and is still adjusting. Patient's chest discomfort resolved after stopping duloxetine     Cardiac catheterization 5/21/2021: Successful FENG of the PDA, mild LAD and RCA disease.   Echocardiogram 2021: Ejection fraction 60 to 65%.   Cardiac CTA 2021: Coronary calcium score 248.   Holter monitor demonstrates sinus rhythm, sinus  bradycardia as well as SVT. Low heart rate is 51. High heart rate is 187.   MPI 3/23/2022: Normal perfusion, ejection fraction 54%.   1 week Holter monitor May 2022 with short runs of atrial tachycardia, otherwise sinus bradycardia to sinus tachycardia.  5/4/2023 , LDL 63, HDL 48, triglycerides 183.  6/9/2023 ECG: Normal sinus rhythm, left axis deviation.  7/31/2023 MPI: Normal perfusion, ejection fraction 63%.     Past Medical History:   He has a past medical history of Pain in right knee (10/12/2021) and Personal history of other mental and behavioral disorders (07/25/2022).    Surgical History:   He has a past surgical history that includes Hernia repair (01/15/2016); Vasectomy (01/15/2016); Lithotripsy (01/15/2016); Colectomy (01/15/2016); Other surgical history (06/08/2021); Other surgical history (04/29/2021); Other surgical history (06/09/2017); and Tonsillectomy (06/09/2017).    Family History:   No family history on file.    Allergies:  Calamine-zinc oxide, Cymbalta [duloxetine], Erythromycin, Hydroxyzine, Trazodone, and Zolpidem     Social History:    · Alcohol use (V49.89) (Z78.9)   · 2 beers on fridays   · Caffeine use (V49.89) (Z78.9)   ·    · Never a smoker   · No drug use   · Occupation   ·  display builder    Prior Cardiovascular Testing (Personally Reviewed):     Review of Systems:  Review of Systems   All other systems reviewed and are negative.      Outpatient Medications:    Current Outpatient Medications:   •  atorvastatin (Lipitor) 80 mg tablet, Take 1 tablet (80 mg) by mouth once daily., Disp: 90 tablet, Rfl: 1  •  cholecalciferol (Vitamin D3) 5,000 Units tablet, 1 tablet DAILY (route: oral), Disp: , Rfl:   •  clopidogrel (Plavix) 75 mg tablet, Take 1 tablet (75 mg) by mouth once daily., Disp: 90 tablet, Rfl: 1  •  docusate sodium (Colace) 100 mg capsule, Take 1 capsule (100 mg) by mouth 2 times a day as needed., Disp: , Rfl:   •  escitalopram (Lexapro) 10 mg tablet, Take  "1 tablet (10 mg) by mouth once daily., Disp: 90 tablet, Rfl: 1  •  fish oil concentrate (Omega-3) 120-180 mg capsule, Take 1 capsule (1 g) by mouth once daily., Disp: , Rfl:   •  gabapentin (Neurontin) 100 mg capsule, Take 1 capsule (100 mg) by mouth 3 times a day., Disp: 270 capsule, Rfl: 1  •  losartan (Cozaar) 100 mg tablet, Take 1 tablet (100 mg) by mouth once daily., Disp: 90 tablet, Rfl: 1  •  melatonin 10 mg capsule, Take 1 capsule (10 mg) by mouth as needed at bedtime for sleep., Disp: , Rfl:   •  multivitamin tablet, 1 tablet DAILY (route: oral), Disp: , Rfl:   •  nitroglycerin (Nitrostat) 0.4 mg SL tablet, Place 1 tablet (0.4 mg) under the tongue every 5 minutes if needed., Disp: , Rfl:   •  omeprazole OTC (PriLOSEC OTC) 20 mg EC tablet, Take 1 tablet (20 mg) by mouth once daily., Disp: , Rfl:   •  vitamins A,C,E-zinc-copper (ICaps AREDS) 4,296 mcg-226 mg-90 mg capsule, Take by mouth., Disp: , Rfl:   •  buPROPion (Wellbutrin) 75 mg tablet, Take 1 tablet (75 mg) by mouth 2 times a day., Disp: 180 tablet, Rfl: 0  •  influenza vac high-dose quad (Fluzone HighDose Quad 20-21 PF) syringe syringe, Inject 0.7 mL into the muscle 1 time., Disp: , Rfl:   •  lactulose 20 gram/30 mL oral solution, Take 30 mL (20 g) by mouth once daily., Disp: , Rfl:      Last Recorded Vitals  /76 (BP Location: Right arm, Patient Position: Sitting, BP Cuff Size: Adult)   Pulse 73   Ht 1.803 m (5' 11\")   Wt 76.7 kg (169 lb)   SpO2 96%   BMI 23.57 kg/m²     Physical Exam:    Physical Exam  Vitals reviewed.   Constitutional:       Appearance: Normal appearance.   HENT:      Head: Normocephalic and atraumatic.      Mouth/Throat:      Mouth: Mucous membranes are moist.      Pharynx: Oropharynx is clear.   Cardiovascular:      Rate and Rhythm: Normal rate and regular rhythm.      Pulses: Normal pulses.      Heart sounds: Normal heart sounds.   Pulmonary:      Effort: Pulmonary effort is normal.      Breath sounds: Normal breath " "sounds.   Abdominal:      General: Bowel sounds are normal.      Palpations: Abdomen is soft.   Musculoskeletal:      Cervical back: Neck supple.   Skin:     General: Skin is warm and dry.   Neurological:      General: No focal deficit present.      Mental Status: He is alert.   Psychiatric:         Mood and Affect: Mood normal.         Behavior: Behavior normal.         Lab/Radiology/Diagnostic Review:    Labs    Lab Results   Component Value Date    GLUCOSE 93 07/12/2023    CALCIUM 9.3 07/12/2023     07/12/2023    K 4.6 07/12/2023    CO2 27 07/12/2023     07/12/2023    BUN 18 07/12/2023    CREATININE 1.16 07/12/2023       Lab Results   Component Value Date    WBC 6.3 07/12/2023    HGB 14.8 07/12/2023    HCT 45.7 07/12/2023    MCV 93 07/12/2023     07/12/2023       Lab Results   Component Value Date    CHOL 148 05/04/2023    CHOL 112 03/07/2022    CHOL 157 10/12/2021     Lab Results   Component Value Date    HDL 48.2 05/04/2023    HDL 46.4 03/07/2022    HDL 51.4 10/12/2021     No results found for: \"LDLCALC\"  Lab Results   Component Value Date    TRIG 183 (H) 05/04/2023    TRIG 46 03/07/2022    TRIG 85 10/12/2021     No components found for: \"CHOLHDL\"    Lab Results   Component Value Date    BNP 32 07/12/2023    BNP 21 12/10/2021       Lab Results   Component Value Date    TSH 1.88 10/12/2021       Assessment:   The patient is doing reasonably well from a cardiac standpoint.      Patient will continue clopidogrel monotherapy for his CAD. He will continue statin therapy for CAD.     Patient will continue fish oil 1 g twice a day for elevated triglycerides.     Will continue losartan 25 mg a day for hypertension.    For the patient's dependent lower extremity edema, I recommended conservative measures.  This includes elevating legs when sitting as well as compression stockings.     Patient will follow up with me in 6 months or sooner if he has more problems.     Thank you for your visit today. " Please contact our office with any questions.     Gerson Rosario MD

## 2023-12-15 NOTE — LETTER
December 15, 2023     Abram Rodas MD  [address] [facility], [address]  [facility] [address]    Patient: Tuan Castro   YOB: 1950   Date of Visit: 12/15/2023       Dear Dr. Abram Rodas MD:    Thank you for referring Tuan Castro to me for evaluation. Below are my notes for this consultation.  If you have questions, please do not hesitate to call me. I look forward to following your patient along with you.       Sincerely,     Gerson Rosario MD      CC: No Recipients  ______________________________________________________________________________________        [facility] Cardiology Outpatient Follow-up Visit     Reason for Visit: CAD    HPI: Tuan Castro is a 73 y.o.  male who presents today for followup.     Patient is a 73-year-old male with a history of CAD, hypertension, dyslipidemia who initially presented with chest discomfort April 2021. EKG demonstrated normal sinus rhythm with reported dynamic changes in leads V1 through V3. Cardiac enzymes were negative. Cardiac CT demonstrated calcium score 248. Echocardiogram demonstrated ejection fraction 60 to 65%. He underwent cardiac catheterization which demonstrated mild CAD with a 70 to 80% stenosis in the mid to distal left posterior descending artery. He was medically managed. He had 1 or 2 other visits to the emergency department with lightheaded and chest discomfort. He underwent revascularization of the PDA. He admits to an occasional small twinge of chest discomfort. He denies any shortness of breath. He denies any syncope, orthopnea, PND. He does admit to varicose veins and some dependent lower extremity edema. He lost his wife and is still adjusting. Patient's chest discomfort resolved after stopping duloxetine     Cardiac catheterization 5/21/2021: Successful FENG of the PDA, mild LAD and RCA disease.   Echocardiogram 2021: Ejection fraction 60 to 65%.   Cardiac CTA 2021:  Coronary calcium score 248.   Holter monitor demonstrates sinus rhythm, sinus bradycardia as well as SVT. Low heart rate is 51. High heart rate is 187.   MPI 3/23/2022: Normal perfusion, ejection fraction 54%.   1 week Holter monitor May 2022 with short runs of atrial tachycardia, otherwise sinus bradycardia to sinus tachycardia.  5/4/2023 , LDL 63, HDL 48, triglycerides 183.  6/9/2023 ECG: Normal sinus rhythm, left axis deviation.  7/31/2023 MPI: Normal perfusion, ejection fraction 63%.     Past Medical History:   He has a past medical history of Pain in right knee (10/12/2021) and Personal history of other mental and behavioral disorders (07/25/2022).    Surgical History:   He has a past surgical history that includes Hernia repair (01/15/2016); Vasectomy (01/15/2016); Lithotripsy (01/15/2016); Colectomy (01/15/2016); Other surgical history (06/08/2021); Other surgical history (04/29/2021); Other surgical history (06/09/2017); and Tonsillectomy (06/09/2017).    Family History:   No family history on file.    Allergies:  Calamine-zinc oxide, Cymbalta [duloxetine], Erythromycin, Hydroxyzine, Trazodone, and Zolpidem     Social History:    · Alcohol use (V49.89) (Z78.9)   · 2 beers on fridays   · Caffeine use (V49.89) (Z78.9)   ·    · Never a smoker   · No drug use   · Occupation   ·  display builder    Prior Cardiovascular Testing (Personally Reviewed):     Review of Systems:  Review of Systems   All other systems reviewed and are negative.      Outpatient Medications:    Current Outpatient Medications:   •  atorvastatin (Lipitor) 80 mg tablet, Take 1 tablet (80 mg) by mouth once daily., Disp: 90 tablet, Rfl: 1  •  cholecalciferol (Vitamin D3) 5,000 Units tablet, 1 tablet DAILY (route: oral), Disp: , Rfl:   •  clopidogrel (Plavix) 75 mg tablet, Take 1 tablet (75 mg) by mouth once daily., Disp: 90 tablet, Rfl: 1  •  docusate sodium (Colace) 100 mg capsule, Take 1 capsule (100 mg) by mouth 2 times  "a day as needed., Disp: , Rfl:   •  escitalopram (Lexapro) 10 mg tablet, Take 1 tablet (10 mg) by mouth once daily., Disp: 90 tablet, Rfl: 1  •  fish oil concentrate (Omega-3) 120-180 mg capsule, Take 1 capsule (1 g) by mouth once daily., Disp: , Rfl:   •  gabapentin (Neurontin) 100 mg capsule, Take 1 capsule (100 mg) by mouth 3 times a day., Disp: 270 capsule, Rfl: 1  •  losartan (Cozaar) 100 mg tablet, Take 1 tablet (100 mg) by mouth once daily., Disp: 90 tablet, Rfl: 1  •  melatonin 10 mg capsule, Take 1 capsule (10 mg) by mouth as needed at bedtime for sleep., Disp: , Rfl:   •  multivitamin tablet, 1 tablet DAILY (route: oral), Disp: , Rfl:   •  nitroglycerin (Nitrostat) 0.4 mg SL tablet, Place 1 tablet (0.4 mg) under the tongue every 5 minutes if needed., Disp: , Rfl:   •  omeprazole OTC (PriLOSEC OTC) 20 mg EC tablet, Take 1 tablet (20 mg) by mouth once daily., Disp: , Rfl:   •  vitamins A,C,E-zinc-copper (ICaps AREDS) 4,296 mcg-226 mg-90 mg capsule, Take by mouth., Disp: , Rfl:   •  buPROPion (Wellbutrin) 75 mg tablet, Take 1 tablet (75 mg) by mouth 2 times a day., Disp: 180 tablet, Rfl: 0  •  influenza vac high-dose quad (Fluzone HighDose Quad 20-21 PF) syringe syringe, Inject 0.7 mL into the muscle 1 time., Disp: , Rfl:   •  lactulose 20 gram/30 mL oral solution, Take 30 mL (20 g) by mouth once daily., Disp: , Rfl:      Last Recorded Vitals  /76 (BP Location: Right arm, Patient Position: Sitting, BP Cuff Size: Adult)   Pulse 73   Ht 1.803 m (5' 11\")   Wt 76.7 kg (169 lb)   SpO2 96%   BMI 23.57 kg/m²     Physical Exam:    Physical Exam  Vitals reviewed.   Constitutional:       Appearance: Normal appearance.   HENT:      Head: Normocephalic and atraumatic.      Mouth/Throat:      Mouth: Mucous membranes are moist.      Pharynx: Oropharynx is clear.   Cardiovascular:      Rate and Rhythm: Normal rate and regular rhythm.      Pulses: Normal pulses.      Heart sounds: Normal heart sounds.   Pulmonary: " "     Effort: Pulmonary effort is normal.      Breath sounds: Normal breath sounds.   Abdominal:      General: Bowel sounds are normal.      Palpations: Abdomen is soft.   Musculoskeletal:      Cervical back: Neck supple.   Skin:     General: Skin is warm and dry.   Neurological:      General: No focal deficit present.      Mental Status: He is alert.   Psychiatric:         Mood and Affect: Mood normal.         Behavior: Behavior normal.         Lab/Radiology/Diagnostic Review:    Labs    Lab Results   Component Value Date    GLUCOSE 93 07/12/2023    CALCIUM 9.3 07/12/2023     07/12/2023    K 4.6 07/12/2023    CO2 27 07/12/2023     07/12/2023    BUN 18 07/12/2023    CREATININE 1.16 07/12/2023       Lab Results   Component Value Date    WBC 6.3 07/12/2023    HGB 14.8 07/12/2023    HCT 45.7 07/12/2023    MCV 93 07/12/2023     07/12/2023       Lab Results   Component Value Date    CHOL 148 05/04/2023    CHOL 112 03/07/2022    CHOL 157 10/12/2021     Lab Results   Component Value Date    HDL 48.2 05/04/2023    HDL 46.4 03/07/2022    HDL 51.4 10/12/2021     No results found for: \"LDLCALC\"  Lab Results   Component Value Date    TRIG 183 (H) 05/04/2023    TRIG 46 03/07/2022    TRIG 85 10/12/2021     No components found for: \"CHOLHDL\"    Lab Results   Component Value Date    BNP 32 07/12/2023    BNP 21 12/10/2021       Lab Results   Component Value Date    TSH 1.88 10/12/2021       Assessment:   The patient is doing reasonably well from a cardiac standpoint.      Patient will continue clopidogrel monotherapy for his CAD. He will continue statin therapy for CAD.     Patient will continue fish oil 1 g twice a day for elevated triglycerides.     Will continue losartan 25 mg a day for hypertension.    For the patient's dependent lower extremity edema, I recommended conservative measures.  This includes elevating legs when sitting as well as compression stockings.     Patient will follow up with me in 6 months " or sooner if he has more problems.     Thank you for your visit today. Please contact our office with any questions.     Gerson Rosario MD

## 2023-12-15 NOTE — PROGRESS NOTES
[facility] Cardiology Outpatient Follow-up Visit     Reason for Visit: CAD    HPI: Tuan Castro is a 73 y.o.  male who presents today for followup.     Patient is a 73-year-old male with a history of CAD, hypertension, dyslipidemia who initially presented with chest discomfort April 2021. EKG demonstrated normal sinus rhythm with reported dynamic changes in leads V1 through V3. Cardiac enzymes were negative. Cardiac CT demonstrated calcium score 248. Echocardiogram demonstrated ejection fraction 60 to 65%. He underwent cardiac catheterization which demonstrated mild CAD with a 70 to 80% stenosis in the mid to distal left posterior descending artery. He was medically managed. He had 1 or 2 other visits to the emergency department with lightheaded and chest discomfort. He underwent revascularization of the PDA. He admits to an occasional small twinge of chest discomfort. He denies any shortness of breath. He denies any syncope, orthopnea, PND. He does admit to varicose veins and some dependent lower extremity edema. He lost his wife and is still adjusting. Patient's chest discomfort resolved after stopping duloxetine     Cardiac catheterization 5/21/2021: Successful FENG of the PDA, mild LAD and RCA disease.   Echocardiogram 2021: Ejection fraction 60 to 65%.   Cardiac CTA 2021: Coronary calcium score 248.   Holter monitor demonstrates sinus rhythm, sinus bradycardia as well as SVT. Low heart rate is 51. High heart rate is 187.   MPI 3/23/2022: Normal perfusion, ejection fraction 54%.   1 week Holter monitor May 2022 with short runs of atrial tachycardia, otherwise sinus bradycardia to sinus tachycardia.  5/4/2023 , LDL 63, HDL 48, triglycerides 183.  6/9/2023 ECG: Normal sinus rhythm, left axis deviation.  7/31/2023 MPI: Normal perfusion, ejection fraction 63%.     Past Medical History:   He has a past medical history of Pain in right knee (10/12/2021) and Personal history of other mental and  behavioral disorders (07/25/2022).    Surgical History:   He has a past surgical history that includes Hernia repair (01/15/2016); Vasectomy (01/15/2016); Lithotripsy (01/15/2016); Colectomy (01/15/2016); Other surgical history (06/08/2021); Other surgical history (04/29/2021); Other surgical history (06/09/2017); and Tonsillectomy (06/09/2017).    Family History:   No family history on file.    Allergies:  Calamine-zinc oxide, Cymbalta [duloxetine], Erythromycin, Hydroxyzine, Trazodone, and Zolpidem     Social History:    · Alcohol use (V49.89) (Z78.9)   · 2 beers on fridays   · Caffeine use (V49.89) (Z78.9)   ·    · Never a smoker   · No drug use   · Occupation   ·  display builder    Prior Cardiovascular Testing (Personally Reviewed):     Review of Systems:  Review of Systems   All other systems reviewed and are negative.      Outpatient Medications:    Current Outpatient Medications:     atorvastatin (Lipitor) 80 mg tablet, Take 1 tablet (80 mg) by mouth once daily., Disp: 90 tablet, Rfl: 1    cholecalciferol (Vitamin D3) 5,000 Units tablet, 1 tablet DAILY (route: oral), Disp: , Rfl:     clopidogrel (Plavix) 75 mg tablet, Take 1 tablet (75 mg) by mouth once daily., Disp: 90 tablet, Rfl: 1    docusate sodium (Colace) 100 mg capsule, Take 1 capsule (100 mg) by mouth 2 times a day as needed., Disp: , Rfl:     escitalopram (Lexapro) 10 mg tablet, Take 1 tablet (10 mg) by mouth once daily., Disp: 90 tablet, Rfl: 1    fish oil concentrate (Omega-3) 120-180 mg capsule, Take 1 capsule (1 g) by mouth once daily., Disp: , Rfl:     gabapentin (Neurontin) 100 mg capsule, Take 1 capsule (100 mg) by mouth 3 times a day., Disp: 270 capsule, Rfl: 1    losartan (Cozaar) 100 mg tablet, Take 1 tablet (100 mg) by mouth once daily., Disp: 90 tablet, Rfl: 1    melatonin 10 mg capsule, Take 1 capsule (10 mg) by mouth as needed at bedtime for sleep., Disp: , Rfl:     multivitamin tablet, 1 tablet DAILY (route: oral),  "Disp: , Rfl:     nitroglycerin (Nitrostat) 0.4 mg SL tablet, Place 1 tablet (0.4 mg) under the tongue every 5 minutes if needed., Disp: , Rfl:     omeprazole OTC (PriLOSEC OTC) 20 mg EC tablet, Take 1 tablet (20 mg) by mouth once daily., Disp: , Rfl:     vitamins A,C,E-zinc-copper (ICaps AREDS) 4,296 mcg-226 mg-90 mg capsule, Take by mouth., Disp: , Rfl:     buPROPion (Wellbutrin) 75 mg tablet, Take 1 tablet (75 mg) by mouth 2 times a day., Disp: 180 tablet, Rfl: 0    influenza vac high-dose quad (Fluzone HighDose Quad 20-21 PF) syringe syringe, Inject 0.7 mL into the muscle 1 time., Disp: , Rfl:     lactulose 20 gram/30 mL oral solution, Take 30 mL (20 g) by mouth once daily., Disp: , Rfl:      Last Recorded Vitals  /76 (BP Location: Right arm, Patient Position: Sitting, BP Cuff Size: Adult)   Pulse 73   Ht 1.803 m (5' 11\")   Wt 76.7 kg (169 lb)   SpO2 96%   BMI 23.57 kg/m²     Physical Exam:    Physical Exam  Vitals reviewed.   Constitutional:       Appearance: Normal appearance.   HENT:      Head: Normocephalic and atraumatic.      Mouth/Throat:      Mouth: Mucous membranes are moist.      Pharynx: Oropharynx is clear.   Cardiovascular:      Rate and Rhythm: Normal rate and regular rhythm.      Pulses: Normal pulses.      Heart sounds: Normal heart sounds.   Pulmonary:      Effort: Pulmonary effort is normal.      Breath sounds: Normal breath sounds.   Abdominal:      General: Bowel sounds are normal.      Palpations: Abdomen is soft.   Musculoskeletal:      Cervical back: Neck supple.   Skin:     General: Skin is warm and dry.   Neurological:      General: No focal deficit present.      Mental Status: He is alert.   Psychiatric:         Mood and Affect: Mood normal.         Behavior: Behavior normal.         Lab/Radiology/Diagnostic Review:    Labs    Lab Results   Component Value Date    GLUCOSE 93 07/12/2023    CALCIUM 9.3 07/12/2023     07/12/2023    K 4.6 07/12/2023    CO2 27 07/12/2023    " " 07/12/2023    BUN 18 07/12/2023    CREATININE 1.16 07/12/2023       Lab Results   Component Value Date    WBC 6.3 07/12/2023    HGB 14.8 07/12/2023    HCT 45.7 07/12/2023    MCV 93 07/12/2023     07/12/2023       Lab Results   Component Value Date    CHOL 148 05/04/2023    CHOL 112 03/07/2022    CHOL 157 10/12/2021     Lab Results   Component Value Date    HDL 48.2 05/04/2023    HDL 46.4 03/07/2022    HDL 51.4 10/12/2021     No results found for: \"LDLCALC\"  Lab Results   Component Value Date    TRIG 183 (H) 05/04/2023    TRIG 46 03/07/2022    TRIG 85 10/12/2021     No components found for: \"CHOLHDL\"    Lab Results   Component Value Date    BNP 32 07/12/2023    BNP 21 12/10/2021       Lab Results   Component Value Date    TSH 1.88 10/12/2021       Assessment:   The patient is doing reasonably well from a cardiac standpoint.      Patient will continue clopidogrel monotherapy for his CAD. He will continue statin therapy for CAD.     Patient will continue fish oil 1 g twice a day for elevated triglycerides.     Will continue losartan 25 mg a day for hypertension.    For the patient's dependent lower extremity edema, I recommended conservative measures.  This includes elevating legs when sitting as well as compression stockings.     Patient will follow up with me in 6 months or sooner if he has more problems.     Thank you for your visit today. Please contact our office with any questions.     Gerson Rosario MD      "

## 2024-01-01 DIAGNOSIS — F32.9 MAJOR DEPRESSIVE DISORDER, REMISSION STATUS UNSPECIFIED, UNSPECIFIED WHETHER RECURRENT: ICD-10-CM

## 2024-01-01 DIAGNOSIS — F43.21 GRIEF: ICD-10-CM

## 2024-01-02 RX ORDER — BUPROPION HYDROCHLORIDE 75 MG/1
75 TABLET ORAL 2 TIMES DAILY
Qty: 180 TABLET | Refills: 0 | Status: SHIPPED | OUTPATIENT
Start: 2024-01-02

## 2024-01-23 ENCOUNTER — TELEPHONE (OUTPATIENT)
Dept: PRIMARY CARE | Facility: CLINIC | Age: 74
End: 2024-01-23
Payer: COMMERCIAL

## 2024-01-23 DIAGNOSIS — F32.A DEPRESSION, UNSPECIFIED DEPRESSION TYPE: ICD-10-CM

## 2024-01-23 DIAGNOSIS — G47.00 INSOMNIA, UNSPECIFIED TYPE: Primary | ICD-10-CM

## 2024-01-23 RX ORDER — TRAZODONE HYDROCHLORIDE 50 MG/1
50 TABLET ORAL NIGHTLY PRN
Qty: 30 TABLET | Refills: 1 | Status: SHIPPED | OUTPATIENT
Start: 2024-01-23 | End: 2025-01-22

## 2024-01-23 RX ORDER — ESCITALOPRAM OXALATE 20 MG/1
20 TABLET ORAL DAILY
Qty: 90 TABLET | Refills: 0 | Status: SHIPPED | OUTPATIENT
Start: 2024-01-23 | End: 2024-03-12 | Stop reason: SDUPTHER

## 2024-03-12 ENCOUNTER — OFFICE VISIT (OUTPATIENT)
Dept: PRIMARY CARE | Facility: CLINIC | Age: 74
End: 2024-03-12
Payer: COMMERCIAL

## 2024-03-12 ENCOUNTER — LAB (OUTPATIENT)
Dept: LAB | Facility: LAB | Age: 74
End: 2024-03-12
Payer: COMMERCIAL

## 2024-03-12 VITALS — DIASTOLIC BLOOD PRESSURE: 75 MMHG | SYSTOLIC BLOOD PRESSURE: 159 MMHG

## 2024-03-12 DIAGNOSIS — I10 ESSENTIAL (PRIMARY) HYPERTENSION: ICD-10-CM

## 2024-03-12 DIAGNOSIS — Z13.220 LIPID SCREENING: ICD-10-CM

## 2024-03-12 DIAGNOSIS — Z00.00 HEALTH CARE MAINTENANCE: ICD-10-CM

## 2024-03-12 DIAGNOSIS — F32.A DEPRESSION, UNSPECIFIED DEPRESSION TYPE: ICD-10-CM

## 2024-03-12 DIAGNOSIS — F41.9 ANXIETY: ICD-10-CM

## 2024-03-12 DIAGNOSIS — E78.2 MIXED HYPERLIPIDEMIA: ICD-10-CM

## 2024-03-12 DIAGNOSIS — I10 ESSENTIAL HYPERTENSION: Primary | ICD-10-CM

## 2024-03-12 DIAGNOSIS — I25.10 CORONARY ARTERY DISEASE INVOLVING NATIVE CORONARY ARTERY OF NATIVE HEART WITHOUT ANGINA PECTORIS: ICD-10-CM

## 2024-03-12 LAB
ALBUMIN SERPL BCP-MCNC: 4.4 G/DL (ref 3.4–5)
ALP SERPL-CCNC: 93 U/L (ref 33–136)
ALT SERPL W P-5'-P-CCNC: 26 U/L (ref 10–52)
ANION GAP SERPL CALC-SCNC: 15 MMOL/L (ref 10–20)
AST SERPL W P-5'-P-CCNC: 20 U/L (ref 9–39)
BILIRUB SERPL-MCNC: 0.7 MG/DL (ref 0–1.2)
BUN SERPL-MCNC: 17 MG/DL (ref 6–23)
CALCIUM SERPL-MCNC: 10 MG/DL (ref 8.6–10.6)
CHLORIDE SERPL-SCNC: 111 MMOL/L (ref 98–107)
CHOLEST SERPL-MCNC: 153 MG/DL (ref 0–199)
CHOLESTEROL/HDL RATIO: 3
CO2 SERPL-SCNC: 23 MMOL/L (ref 21–32)
CREAT SERPL-MCNC: 1.06 MG/DL (ref 0.5–1.3)
EGFRCR SERPLBLD CKD-EPI 2021: 74 ML/MIN/1.73M*2
ERYTHROCYTE [DISTWIDTH] IN BLOOD BY AUTOMATED COUNT: 12.3 % (ref 11.5–14.5)
GLUCOSE SERPL-MCNC: 92 MG/DL (ref 74–99)
HCT VFR BLD AUTO: 45 % (ref 41–52)
HDLC SERPL-MCNC: 51.1 MG/DL
HGB BLD-MCNC: 15.3 G/DL (ref 13.5–17.5)
LDLC SERPL CALC-MCNC: 82 MG/DL
MCH RBC QN AUTO: 30.4 PG (ref 26–34)
MCHC RBC AUTO-ENTMCNC: 34 G/DL (ref 32–36)
MCV RBC AUTO: 89 FL (ref 80–100)
NON HDL CHOLESTEROL: 102 MG/DL (ref 0–149)
NRBC BLD-RTO: 0 /100 WBCS (ref 0–0)
PLATELET # BLD AUTO: 245 X10*3/UL (ref 150–450)
POTASSIUM SERPL-SCNC: 4.3 MMOL/L (ref 3.5–5.3)
PROT SERPL-MCNC: 7.4 G/DL (ref 6.4–8.2)
RBC # BLD AUTO: 5.04 X10*6/UL (ref 4.5–5.9)
SODIUM SERPL-SCNC: 145 MMOL/L (ref 136–145)
TRIGL SERPL-MCNC: 98 MG/DL (ref 0–149)
TSH SERPL-ACNC: 1.35 MIU/L (ref 0.44–3.98)
VLDL: 20 MG/DL (ref 0–40)
WBC # BLD AUTO: 8.8 X10*3/UL (ref 4.4–11.3)

## 2024-03-12 PROCEDURE — 3077F SYST BP >= 140 MM HG: CPT | Performed by: STUDENT IN AN ORGANIZED HEALTH CARE EDUCATION/TRAINING PROGRAM

## 2024-03-12 PROCEDURE — 84443 ASSAY THYROID STIM HORMONE: CPT

## 2024-03-12 PROCEDURE — 85027 COMPLETE CBC AUTOMATED: CPT

## 2024-03-12 PROCEDURE — G2211 COMPLEX E/M VISIT ADD ON: HCPCS | Performed by: STUDENT IN AN ORGANIZED HEALTH CARE EDUCATION/TRAINING PROGRAM

## 2024-03-12 PROCEDURE — 1036F TOBACCO NON-USER: CPT | Performed by: STUDENT IN AN ORGANIZED HEALTH CARE EDUCATION/TRAINING PROGRAM

## 2024-03-12 PROCEDURE — 80053 COMPREHEN METABOLIC PANEL: CPT

## 2024-03-12 PROCEDURE — 80061 LIPID PANEL: CPT

## 2024-03-12 PROCEDURE — 3078F DIAST BP <80 MM HG: CPT | Performed by: STUDENT IN AN ORGANIZED HEALTH CARE EDUCATION/TRAINING PROGRAM

## 2024-03-12 PROCEDURE — 1126F AMNT PAIN NOTED NONE PRSNT: CPT | Performed by: STUDENT IN AN ORGANIZED HEALTH CARE EDUCATION/TRAINING PROGRAM

## 2024-03-12 PROCEDURE — 36415 COLL VENOUS BLD VENIPUNCTURE: CPT

## 2024-03-12 PROCEDURE — 99214 OFFICE O/P EST MOD 30 MIN: CPT | Performed by: STUDENT IN AN ORGANIZED HEALTH CARE EDUCATION/TRAINING PROGRAM

## 2024-03-12 RX ORDER — ESCITALOPRAM OXALATE 20 MG/1
20 TABLET ORAL DAILY
Qty: 90 TABLET | Refills: 1 | Status: SHIPPED | OUTPATIENT
Start: 2024-03-12

## 2024-03-12 NOTE — PROGRESS NOTES
Subjective   Patient ID: Tuan Castro is a 73 y.o. male who presents for Follow-up.    [facility]   Routine fu. Longitudinal follow-up.    Med refill.  He continues to have lots of stress in his life.  Daughter lost her , has 3 adopted children to take care of.  Son recently broke up with his girlfriend.  Wife passed away last year.  He thinks escitalopram is helping with depression/anxiety.  He is going to [place] in June for a hunting trip.  Review of Systems  12-point ROS reviewed and was negative unless otherwise noted in HPI.    Objective   /75     Physical Exam  GEN: conversant, NAD  HEENT: PERRL, EOMI, MMM  NECK: supple  CV: S1, S2, RRR  PULM: CTAB  ABD: soft, NT, ND  NEURO: no new gross focal deficits  EXT: no sig LE edema  PSYCH: appropriate affect    Assessment/Plan     #Depression/anxiety: continue escitalopram. Offered referral to psychiatry or psychology, patient declines.     #Paroxysmal SVT: seen on recent Holter monitor, ablation, seeing cardiology     #CAD  s/p FENG, is having no cardiac symptoms, currently on Plavix per cardiology     # HTN:   -On losartan 25 mg daily, BP OK at home.     # HLD:  -CACS 248 in 2020  -Continue atorvastatin  -encouraged diet and exercise     # Misc:  - Tinnitus  - Sensorineural hearing loss  - hx partial colectomy, follows with surgeon   - h/o reported brain bleed ~2010, no current issues     # HM:  - Vaccines: Prevnar 13 given in fall 2016, Pneumovax 23 given in 2017, Shingrix (2019), TDaP (2019), COVID UTD  - Normal colonoscopy 10/2018, follows with GI  - Routine labs     RTC in 4 months

## 2024-04-01 ENCOUNTER — HOSPITAL ENCOUNTER (EMERGENCY)
Facility: HOSPITAL | Age: 74
Discharge: HOME | End: 2024-04-02
Attending: STUDENT IN AN ORGANIZED HEALTH CARE EDUCATION/TRAINING PROGRAM
Payer: COMMERCIAL

## 2024-04-01 ENCOUNTER — APPOINTMENT (OUTPATIENT)
Dept: CARDIOLOGY | Facility: HOSPITAL | Age: 74
End: 2024-04-01
Payer: COMMERCIAL

## 2024-04-01 ENCOUNTER — APPOINTMENT (OUTPATIENT)
Dept: RADIOLOGY | Facility: HOSPITAL | Age: 74
End: 2024-04-01
Payer: COMMERCIAL

## 2024-04-01 DIAGNOSIS — R19.7 DIARRHEA, UNSPECIFIED TYPE: Primary | ICD-10-CM

## 2024-04-01 LAB
ALBUMIN SERPL BCP-MCNC: 4.8 G/DL (ref 3.4–5)
ALP SERPL-CCNC: 97 U/L (ref 33–136)
ALT SERPL W P-5'-P-CCNC: 30 U/L (ref 10–52)
ANION GAP SERPL CALC-SCNC: 14 MMOL/L (ref 10–20)
APPEARANCE UR: CLEAR
AST SERPL W P-5'-P-CCNC: 23 U/L (ref 9–39)
BASOPHILS # BLD AUTO: 0.01 X10*3/UL (ref 0–0.1)
BASOPHILS NFR BLD AUTO: 0.1 %
BILIRUB SERPL-MCNC: 1.1 MG/DL (ref 0–1.2)
BILIRUB UR STRIP.AUTO-MCNC: NEGATIVE MG/DL
BUN SERPL-MCNC: 17 MG/DL (ref 6–23)
CALCIUM SERPL-MCNC: 9.7 MG/DL (ref 8.6–10.3)
CHLORIDE SERPL-SCNC: 105 MMOL/L (ref 98–107)
CO2 SERPL-SCNC: 26 MMOL/L (ref 21–32)
COLOR UR: YELLOW
CREAT SERPL-MCNC: 0.99 MG/DL (ref 0.5–1.3)
EGFRCR SERPLBLD CKD-EPI 2021: 80 ML/MIN/1.73M*2
EOSINOPHIL # BLD AUTO: 0.06 X10*3/UL (ref 0–0.4)
EOSINOPHIL NFR BLD AUTO: 0.5 %
ERYTHROCYTE [DISTWIDTH] IN BLOOD BY AUTOMATED COUNT: 12.5 % (ref 11.5–14.5)
GLUCOSE SERPL-MCNC: 113 MG/DL (ref 74–99)
GLUCOSE UR STRIP.AUTO-MCNC: NEGATIVE MG/DL
HCT VFR BLD AUTO: 50.5 % (ref 41–52)
HGB BLD-MCNC: 16.6 G/DL (ref 13.5–17.5)
IMM GRANULOCYTES # BLD AUTO: 0.02 X10*3/UL (ref 0–0.5)
IMM GRANULOCYTES NFR BLD AUTO: 0.2 % (ref 0–0.9)
KETONES UR STRIP.AUTO-MCNC: NEGATIVE MG/DL
LACTATE SERPL-SCNC: 1.3 MMOL/L (ref 0.4–2)
LEUKOCYTE ESTERASE UR QL STRIP.AUTO: NEGATIVE
LIPASE SERPL-CCNC: 21 U/L (ref 9–82)
LYMPHOCYTES # BLD AUTO: 0.33 X10*3/UL (ref 0.8–3)
LYMPHOCYTES NFR BLD AUTO: 2.9 %
MCH RBC QN AUTO: 30 PG (ref 26–34)
MCHC RBC AUTO-ENTMCNC: 32.9 G/DL (ref 32–36)
MCV RBC AUTO: 91 FL (ref 80–100)
MONOCYTES # BLD AUTO: 0.45 X10*3/UL (ref 0.05–0.8)
MONOCYTES NFR BLD AUTO: 4 %
NEUTROPHILS # BLD AUTO: 10.52 X10*3/UL (ref 1.6–5.5)
NEUTROPHILS NFR BLD AUTO: 92.3 %
NITRITE UR QL STRIP.AUTO: NEGATIVE
NRBC BLD-RTO: 0 /100 WBCS (ref 0–0)
PH UR STRIP.AUTO: 5 [PH]
PLATELET # BLD AUTO: 184 X10*3/UL (ref 150–450)
POTASSIUM SERPL-SCNC: 4.5 MMOL/L (ref 3.5–5.3)
PROT SERPL-MCNC: 8.2 G/DL (ref 6.4–8.2)
PROT UR STRIP.AUTO-MCNC: NEGATIVE MG/DL
RBC # BLD AUTO: 5.54 X10*6/UL (ref 4.5–5.9)
RBC # UR STRIP.AUTO: NEGATIVE /UL
SODIUM SERPL-SCNC: 140 MMOL/L (ref 136–145)
SP GR UR STRIP.AUTO: 1.04
UROBILINOGEN UR STRIP.AUTO-MCNC: <2 MG/DL
WBC # BLD AUTO: 11.4 X10*3/UL (ref 4.4–11.3)

## 2024-04-01 PROCEDURE — 99285 EMERGENCY DEPT VISIT HI MDM: CPT | Mod: 25

## 2024-04-01 PROCEDURE — 93005 ELECTROCARDIOGRAM TRACING: CPT

## 2024-04-01 PROCEDURE — 85025 COMPLETE CBC W/AUTO DIFF WBC: CPT | Performed by: NURSE PRACTITIONER

## 2024-04-01 PROCEDURE — 84075 ASSAY ALKALINE PHOSPHATASE: CPT | Performed by: NURSE PRACTITIONER

## 2024-04-01 PROCEDURE — 74177 CT ABD & PELVIS W/CONTRAST: CPT

## 2024-04-01 PROCEDURE — 2500000004 HC RX 250 GENERAL PHARMACY W/ HCPCS (ALT 636 FOR OP/ED): Performed by: NURSE PRACTITIONER

## 2024-04-01 PROCEDURE — 83690 ASSAY OF LIPASE: CPT | Performed by: NURSE PRACTITIONER

## 2024-04-01 PROCEDURE — 74177 CT ABD & PELVIS W/CONTRAST: CPT | Performed by: RADIOLOGY

## 2024-04-01 PROCEDURE — 81003 URINALYSIS AUTO W/O SCOPE: CPT | Performed by: NURSE PRACTITIONER

## 2024-04-01 PROCEDURE — 96361 HYDRATE IV INFUSION ADD-ON: CPT

## 2024-04-01 PROCEDURE — 2550000001 HC RX 255 CONTRASTS: Performed by: NURSE PRACTITIONER

## 2024-04-01 PROCEDURE — 87636 SARSCOV2 & INF A&B AMP PRB: CPT | Performed by: NURSE PRACTITIONER

## 2024-04-01 PROCEDURE — 83605 ASSAY OF LACTIC ACID: CPT | Performed by: NURSE PRACTITIONER

## 2024-04-01 PROCEDURE — 36415 COLL VENOUS BLD VENIPUNCTURE: CPT | Performed by: NURSE PRACTITIONER

## 2024-04-01 PROCEDURE — 96374 THER/PROPH/DIAG INJ IV PUSH: CPT | Mod: 59

## 2024-04-01 RX ORDER — ONDANSETRON HYDROCHLORIDE 2 MG/ML
4 INJECTION, SOLUTION INTRAVENOUS ONCE
Status: COMPLETED | OUTPATIENT
Start: 2024-04-01 | End: 2024-04-01

## 2024-04-01 RX ADMIN — SODIUM CHLORIDE 500 ML: 9 INJECTION, SOLUTION INTRAVENOUS at 23:25

## 2024-04-01 RX ADMIN — IOHEXOL 75 ML: 350 INJECTION, SOLUTION INTRAVENOUS at 22:22

## 2024-04-01 RX ADMIN — ONDANSETRON 4 MG: 2 INJECTION INTRAMUSCULAR; INTRAVENOUS at 23:28

## 2024-04-01 ASSESSMENT — LIFESTYLE VARIABLES
EVER HAD A DRINK FIRST THING IN THE MORNING TO STEADY YOUR NERVES TO GET RID OF A HANGOVER: NO
HAVE YOU EVER FELT YOU SHOULD CUT DOWN ON YOUR DRINKING: NO
EVER FELT BAD OR GUILTY ABOUT YOUR DRINKING: NO
HAVE PEOPLE ANNOYED YOU BY CRITICIZING YOUR DRINKING: NO
TOTAL SCORE: 0

## 2024-04-01 ASSESSMENT — COLUMBIA-SUICIDE SEVERITY RATING SCALE - C-SSRS
2. HAVE YOU ACTUALLY HAD ANY THOUGHTS OF KILLING YOURSELF?: NO
6. HAVE YOU EVER DONE ANYTHING, STARTED TO DO ANYTHING, OR PREPARED TO DO ANYTHING TO END YOUR LIFE?: NO
1. IN THE PAST MONTH, HAVE YOU WISHED YOU WERE DEAD OR WISHED YOU COULD GO TO SLEEP AND NOT WAKE UP?: NO

## 2024-04-01 ASSESSMENT — PAIN - FUNCTIONAL ASSESSMENT: PAIN_FUNCTIONAL_ASSESSMENT: 0-10

## 2024-04-01 ASSESSMENT — PAIN SCALES - GENERAL: PAINLEVEL_OUTOF10: 0 - NO PAIN

## 2024-04-01 NOTE — ED TRIAGE NOTES
Pt comes into ed via private auto. Diarrhea since 1300. No relief with meds. Denies bloody stool. States nausea no vomiting. States abd pain/ no other complaints

## 2024-04-01 NOTE — ED TRIAGE NOTES
This patient was seen in triage.     Vitals are noted.      HPI:  Patient is a 73-year-old male presenting to the emergency department with diarrhea that started today, copious amount, has had 5 episodes while in the emergency department.  Also with abdominal pain, chills, nausea.  Denies any exposure to any sick contacts, denies any recent antibiotic use, no hematemesis or hematochezia.    Focused PE:  Gen: Well-appearing, not in acute distress  Cardiovascular: Regular rate, normal rhythm, no murmur, no gallop  Respiratory: No adventitious lung sounds auscultated.  Abdomen: Tender on palpation diffusely, physical exam may be limited by patient positioning sitting up in a chair  Neuro:  Alert and Oriented, speech clear and coherent     Plan:  IV, lab work, imaging        For the remainder of the patient's workup and ED course, please see the main ED provider note.  We discussed need for diagnostic testing including lab studies and imaging.  We also discussed that they may be asked to wait in the waiting room while these test are pending.  They understand that if they choose to leave without having the testing completed or resulted that we cannot rule out acute life-threatening illnesses and the risks involved to lead to worsening condition, permanent disability or even death.

## 2024-04-02 VITALS
WEIGHT: 200 LBS | HEIGHT: 71 IN | DIASTOLIC BLOOD PRESSURE: 88 MMHG | HEART RATE: 105 BPM | OXYGEN SATURATION: 94 % | RESPIRATION RATE: 17 BRPM | BODY MASS INDEX: 28 KG/M2 | TEMPERATURE: 97.9 F | SYSTOLIC BLOOD PRESSURE: 151 MMHG

## 2024-04-02 LAB
FLUAV RNA RESP QL NAA+PROBE: NOT DETECTED
FLUBV RNA RESP QL NAA+PROBE: NOT DETECTED
SARS-COV-2 RNA RESP QL NAA+PROBE: NOT DETECTED

## 2024-04-02 NOTE — ED PROVIDER NOTES
EMERGENCY DEPARTMENT ENCOUNTER      Pt Name: Tuan Castro  MRN: [id number]  Birthdate 1950  Date of evaluation: 4/1/2024  Provider: Hiren Marin DO    CHIEF COMPLAINT       Chief Complaint   Patient presents with    Diarrhea       HISTORY OF PRESENT ILLNESS    Tuan Castro is a 73 y.o. male who presents to the emergency department with Himself for 5 episodes of brown watery diarrhea which has since resolved since 8 PM this evening.  He did have some associated nausea which has also resolved.  Endorses 2 abdominal surgeries including sigmoidectomy secondary to precancerous lesions and hernia repair.  Tolerating p.o. at this time.  Denies any measured fevers or associated symptoms at this time.  Denies any abdominal tenderness.          Nursing Notes were reviewed.    REVIEW OF SYSTEMS     CONSTITUTIONAL: Denies fever, sweats, chills.   NEURO: Denies difficulty walking, numbness, weakness, tingling, headache.   HEENT: Denies sore throat, rhinorrhea, changes in vision.   CARDIO: Denies chest pain, palpitations.  PULM: Denies shortness of breath, cough.   GI: Endorses diarrhea, nausea.  Denies abdominal pain, vomiting, constipation, melena, hematochezia.  : Denies painful urination, frequency, hematuria.   MSK: Denies recent trauma.   SKIN: Denies rash, lesions.   ENDOCRINE: Denies unexpected weight-loss.   HEME: Denies bleeding disorder.     PAST MEDICAL HISTORY     Past Medical History:   Diagnosis Date    Pain in right knee 10/12/2021    Right knee pain    Personal history of other mental and behavioral disorders 07/25/2022    History of depression       SURGICAL HISTORY       Past Surgical History:   Procedure Laterality Date    COLECTOMY  01/15/2016    Partial Colectomy    HERNIA REPAIR  01/15/2016    Hernia Repair    LITHOTRIPSY  01/15/2016    Renal Lithotripsy    OTHER SURGICAL HISTORY  06/08/2021    Cardiac catheterization with stent placement    OTHER SURGICAL HISTORY   04/29/2021    Cardiac catheterization    OTHER SURGICAL HISTORY  06/09/2017    Rhinologic Surgery    TONSILLECTOMY  06/09/2017    Tonsillectomy    VASECTOMY  01/15/2016    Surgery Vas Deferens Vasectomy       ALLERGIES     Calamine-zinc oxide, Cymbalta [duloxetine], Erythromycin, Hydroxyzine, Trazodone, and Zolpidem    FAMILY HISTORY     No family history on file.     SOCIAL HISTORY       Social History     Socioeconomic History    Marital status:      Spouse name: Not on file    Number of children: Not on file    Years of education: Not on file    Highest education level: Not on file   Occupational History    Not on file   Tobacco Use    Smoking status: Never    Smokeless tobacco: Never   Vaping Use    Vaping Use: Never used   Substance and Sexual Activity    Alcohol use: Yes     Comment: rarely    Drug use: Not Currently    Sexual activity: Not on file   Other Topics Concern    Not on file   Social History Narrative    Not on file     Social Determinants of Health     Financial Resource Strain: Not on file   Food Insecurity: Not on file   Transportation Needs: Not on file   Physical Activity: Not on file   Stress: Not on file   Social Connections: Not on file   Intimate Partner Violence: Not on file   Housing Stability: Not on file       PHYSICAL EXAM   VS: As documented in the triage note from today's date and EMR flowsheet were reviewed.  Gen: Well developed. No acute distress. Seated in bed. Appears nontoxic.   Skin: Warm. Dry. Intact. No rashes or lesions.  Eyes: Pupils equally round and reactive to light. Clear sclera.   HENT: Atraumatic appearance. Mucosal membranes moist. No oral lesions, uvula midline, airway patent.   CV: Tachycardic rate and regular rhythm. S1, S2. No pedal edema. Warm extremities.  Resp: Nonlabored breathing Clear to auscultation bilaterally. No increased work of breathing.   GI: Soft and nontender. No rebound or guarding. Bowel sounds x4 present.  Smith sign McBurney's point  tenderness is negative no CVA tenderness  MSK: Symmetric muscle bulk. No joint swelling in the extremities. Compartments are soft. Neurovascularly intact x4 extremities. Radial pulses +2 equal bilaterally.  Pedal pulses +2 equal bilaterally.  Neuro: Alert. Speech fluent. Moving all extremities. No focal deficits. Gait normal.  Psych: Appropriate. Kempt.    DIAGNOSTIC RESULTS   RADIOLOGY:   Non-plain film images such as CT, Ultrasound and MRI are read by the radiologist. Plain radiographic images are visualized and preliminarily interpreted by the emergency physician with the below findings:      Interpretation per the Radiologist below, if available at the time of this note:    CT abdomen pelvis w IV contrast   Final Result   1. Nonspecific fluid within distal small bowel and proximal colonic   loops, correlate for enterocolitis.   2. Cholelithiasis no CT evidence of acute cholecystitis.   3. Nonobstructing right renal calculi.   4. Mild hepatic steatosis.        MACRO:   None        Signed by: Mark Lynn 4/1/2024 11:04 PM   Dictation workstation:   [id number]            ED BEDSIDE ULTRASOUND:   Performed by ED Physician - none    LABS:  Labs Reviewed   CBC WITH AUTO DIFFERENTIAL - Abnormal       Result Value    WBC 11.4 (*)     nRBC 0.0      RBC 5.54      Hemoglobin 16.6      Hematocrit 50.5      MCV 91      MCH 30.0      MCHC 32.9      RDW 12.5      Platelets 184      Neutrophils % 92.3      Immature Granulocytes %, Automated 0.2      Lymphocytes % 2.9      Monocytes % 4.0      Eosinophils % 0.5      Basophils % 0.1      Neutrophils Absolute 10.52 (*)     Immature Granulocytes Absolute, Automated 0.02      Lymphocytes Absolute 0.33 (*)     Monocytes Absolute 0.45      Eosinophils Absolute 0.06      Basophils Absolute 0.01     COMPREHENSIVE METABOLIC PANEL - Abnormal    Glucose 113 (*)     Sodium 140      Potassium 4.5      Chloride 105      Bicarbonate 26      Anion Gap 14      Urea Nitrogen 17       Creatinine 0.99      eGFR 80      Calcium 9.7      Albumin 4.8      Alkaline Phosphatase 97      Total Protein 8.2      AST 23      Bilirubin, Total 1.1      ALT 30     URINALYSIS WITH REFLEX CULTURE AND MICROSCOPIC - Abnormal    Color, Urine Yellow      Appearance, Urine Clear      Specific Gravity, Urine 1.040 (*)     pH, Urine 5.0      Protein, Urine NEGATIVE      Glucose, Urine NEGATIVE      Blood, Urine NEGATIVE      Ketones, Urine NEGATIVE      Bilirubin, Urine NEGATIVE      Urobilinogen, Urine <2.0      Nitrite, Urine NEGATIVE      Leukocyte Esterase, Urine NEGATIVE     LIPASE - Normal    Lipase 21      Narrative:     Venipuncture immediately after or during the administration of Metamizole may lead to falsely low results. Testing should be performed immediately prior to Metamizole dosing.   LACTATE - Normal    Lactate 1.3      Narrative:     Venipuncture immediately after or during the administration of Metamizole may lead to falsely low results. Testing should be performed immediately  prior to Metamizole dosing.   SARS-COV-2 AND INFLUENZA A/B PCR - Normal    Flu A Result Not Detected      Flu B Result Not Detected      Coronavirus 2019, PCR Not Detected      Narrative:     This assay has received FDA Emergency Use Authorization (EUA) and  is only authorized for the duration of time that circumstances exist to justify the authorization of the emergency use of in vitro diagnostic tests for the detection of SARS-CoV-2 virus and/or diagnosis of COVID-19 infection under section 564(b)(1) of the Act, 21 U.S.C. 360bbb-3(b)(1). Testing for SARS-CoV-2 is only recommended for patients who meet current clinical and/or epidemiological criteria as defined by federal, state, or local public health directives. This assay is an in vitro diagnostic nucleic acid amplification test for the qualitative detection of SARS-CoV-2, Influenza A, and Influenza B from nasopharyngeal specimens and has been validated for use at  "[facility]. Negative results do not preclude COVID-19 infections or Influenza A/B infections, and should not be used as the sole basis for diagnosis, treatment, or other management decisions. If Influenza A/B and RSV PCR results are negative, testing for Parainfluenza virus, Adenovirus and Metapneumovirus is routinely performed for [facility] pediatric oncology and intensive care inpatients, and is available on other patients by placing an add-on request.    URINALYSIS WITH REFLEX CULTURE AND MICROSCOPIC    Narrative:     The following orders were created for panel order Urinalysis with Reflex Culture and Microscopic.  Procedure                               Abnormality         Status                     ---------                               -----------         ------                     Urinalysis with Reflex C...[[id number]]  Abnormal            Final result               Extra Urine Gray Tube[[id number]]                                                         Please view results for these tests on the individual orders.   EXTRA URINE GRAY TUBE       All other labs were within normal range or not returned as of this dictation.    EMERGENCY DEPARTMENT COURSE/MDM:   Vitals:    Vitals:    04/01/24 1951 04/02/24 0000   BP: 151/88    Pulse: (!) 107 (!) 105   Resp: 16 17   Temp: 36.6 °C (97.9 °F)    SpO2: 96% 94%   Weight: 90.7 kg (200 lb)    Height: 1.8 m (5' 10.87\")        I reviewed the patient's triage vitals and they are hypertensive recommend follow-up with primary physician for repeat checks.  Tachycardic will give fluids suspect component of dehydration secondary to diarrhea.    Due to the above findings the following was ordered basic labs CT imaging the abdomen pelvis by triage.  EKG was added on for tachycardia.    Patient overall feeling improved at this time we will continue to hydrate.  Denies any recent antibiotic usage he is not immunocompromise.  Low concern for C. difficile.  EKG " without acute injury pattern no runs of dysrhythmias.    Mild leukocytosis suspect reactive low concern for infectious colitis.  No significant electrolyte derangements urinalysis not consistent with UTI viral swabs are negative.  CT imaging the abdomen pelvis consistent with enteritis.  Incidental findings were discussed with the patient recommended close follow-up in regards to CT imaging.  Prior to discharge patient's heart rate on my exam was 92.  Patient appreciative of care tolerating p.o. plan for discharge.  Nursing staff did not inform me of patient refusing vital signs prior to discharge.    ED Course as of 04/03/24 0028 Mon Apr 01, 2024 2337 Interpreted by the Emergency Department Attending: ECG revealed sinus tachycardia at a rate of 103 beats per minute with NE interval 149 , QRS of 103 , QTc of 469.  No acute injury pattern. Previous EKG on July 28 revealed no significant changes.    [MG]      ED Course User Index  [MG] Hiren Marin DO         Diagnoses as of 04/03/24 0028   Diarrhea, unspecified type       Patient was counseled regarding labs, imaging, likely diagnosis, and plan. All questions were answered.     ------------------------------------------------------------------  Information provided by the patient  Past medical history complicating workup previous abdominal surgeries  Previous medical records reviewed previous office visit 3/12/2024  ------------------------------------------------------------------  ED Medications administered this visit:    Medications   ondansetron (Zofran) injection 4 mg (4 mg intravenous Given 4/1/24 2328)   sodium chloride 0.9 % bolus 500 mL (0 mL intravenous Stopped 4/2/24 0025)   iohexol (OMNIPaque) 350 mg iodine/mL solution 75 mL (75 mL intravenous Given 4/1/24 2222)       New Prescriptions from this visit:    Discharge Medication List as of 4/2/2024  1:26 AM          Follow-up:  Abram Rodas MD  [address],  [address]  [facility] [address]  [phone]    Schedule an appointment as soon as possible for a visit in 2 days      [facility]  [address]  [facility] [address] [phone]  Go to   If symptoms worsen        Final Impression:   1. Diarrhea, unspecified type          Hiren Marin DO    (Please note that portions of this note were completed with a voice recognition program.  Efforts were made to edit the dictations but occasionally words are mis-transcribed.)     Hiren Marin DO  04/03/24 0031

## 2024-04-02 NOTE — DISCHARGE INSTRUCTIONS
Suspect gastroenteritis at this time recommend that you follow-up with your primary physician in the coming days to ensure improvement/resolution.  Should you begin experiencing bloody stools persistent nausea vomiting signs of dehydration fevers abdominal pain new chest pain symptoms concerning to call 911 or return immediately.

## 2024-04-05 LAB
ATRIAL RATE: 103 BPM
P AXIS: 38 DEGREES
PR INTERVAL: 149 MS
Q ONSET: 253 MS
QRS COUNT: 17 BEATS
QRS DURATION: 103 MS
QT INTERVAL: 358 MS
QTC CALCULATION(BAZETT): 469 MS
QTC FREDERICIA: 429 MS
R AXIS: -45 DEGREES
T AXIS: 9 DEGREES
T OFFSET: 432 MS
VENTRICULAR RATE: 103 BPM

## 2024-05-07 DIAGNOSIS — R07.9 CHEST PAIN, UNSPECIFIED TYPE: ICD-10-CM

## 2024-05-07 RX ORDER — NITROGLYCERIN 0.4 MG/1
0.4 TABLET SUBLINGUAL EVERY 5 MIN PRN
Qty: 90 TABLET | Refills: 3 | Status: SHIPPED | OUTPATIENT
Start: 2024-05-07 | End: 2025-05-07

## 2024-05-21 PROBLEM — H61.20 IMPACTED CERUMEN: Status: ACTIVE | Noted: 2024-05-21

## 2024-05-21 PROBLEM — E66.3 OVERWEIGHT WITH BODY MASS INDEX (BMI) 25.0-29.9: Status: ACTIVE | Noted: 2024-05-21

## 2024-05-21 PROBLEM — R93.1 ABNORMAL CORONARY ANGIOGRAM: Status: ACTIVE | Noted: 2024-03-12

## 2024-05-28 DIAGNOSIS — I25.10 CORONARY ARTERY DISEASE INVOLVING NATIVE HEART, UNSPECIFIED VESSEL OR LESION TYPE, UNSPECIFIED WHETHER ANGINA PRESENT: ICD-10-CM

## 2024-05-28 RX ORDER — ATORVASTATIN CALCIUM 80 MG/1
80 TABLET, FILM COATED ORAL DAILY
Qty: 90 TABLET | Refills: 0 | Status: SHIPPED | OUTPATIENT
Start: 2024-05-28

## 2024-05-28 RX ORDER — CLOPIDOGREL BISULFATE 75 MG/1
75 TABLET ORAL DAILY
Qty: 90 TABLET | Refills: 0 | Status: SHIPPED | OUTPATIENT
Start: 2024-05-28

## 2024-06-11 ENCOUNTER — TELEPHONE (OUTPATIENT)
Dept: PRIMARY CARE | Facility: CLINIC | Age: 74
End: 2024-06-11
Payer: COMMERCIAL

## 2024-06-11 DIAGNOSIS — M79.643 PAIN OF HAND, UNSPECIFIED LATERALITY: Primary | ICD-10-CM

## 2024-06-14 ENCOUNTER — APPOINTMENT (OUTPATIENT)
Dept: CARDIOLOGY | Facility: CLINIC | Age: 74
End: 2024-06-14
Payer: COMMERCIAL

## 2024-06-14 VITALS
HEART RATE: 92 BPM | WEIGHT: 202.6 LBS | DIASTOLIC BLOOD PRESSURE: 68 MMHG | HEIGHT: 71 IN | SYSTOLIC BLOOD PRESSURE: 152 MMHG | BODY MASS INDEX: 28.36 KG/M2 | OXYGEN SATURATION: 94 %

## 2024-06-14 DIAGNOSIS — Z95.5 PRESENCE OF STENT IN CORONARY ARTERY: ICD-10-CM

## 2024-06-14 DIAGNOSIS — I47.10 PSVT (PAROXYSMAL SUPRAVENTRICULAR TACHYCARDIA) (CMS-HCC): ICD-10-CM

## 2024-06-14 DIAGNOSIS — I25.10 CORONARY ARTERY DISEASE INVOLVING NATIVE CORONARY ARTERY OF NATIVE HEART WITHOUT ANGINA PECTORIS: Primary | ICD-10-CM

## 2024-06-14 DIAGNOSIS — E78.49 OTHER HYPERLIPIDEMIA: ICD-10-CM

## 2024-06-14 DIAGNOSIS — I10 ESSENTIAL HYPERTENSION: ICD-10-CM

## 2024-06-14 PROCEDURE — 3077F SYST BP >= 140 MM HG: CPT | Performed by: INTERNAL MEDICINE

## 2024-06-14 PROCEDURE — 3078F DIAST BP <80 MM HG: CPT | Performed by: INTERNAL MEDICINE

## 2024-06-14 PROCEDURE — 1159F MED LIST DOCD IN RCRD: CPT | Performed by: INTERNAL MEDICINE

## 2024-06-14 PROCEDURE — 1036F TOBACCO NON-USER: CPT | Performed by: INTERNAL MEDICINE

## 2024-06-14 PROCEDURE — 99214 OFFICE O/P EST MOD 30 MIN: CPT | Performed by: INTERNAL MEDICINE

## 2024-06-14 RX ORDER — MULTIVIT-MINS/IRON/FOLIC/LYCOP 8-200-600
1 TABLET ORAL DAILY
COMMUNITY
Start: 2021-06-28

## 2024-06-14 NOTE — LETTER
June 14, 2024       No Recipients    Patient: Tuan Castro   YOB: 1950   Date of Visit: 6/14/2024       Dear Dr. Maurer Recipients:    Thank you for referring Tuan Castro to me for evaluation. Below are my notes for this consultation.  If you have questions, please do not hesitate to call me. I look forward to following your patient along with you.       Sincerely,     Gerson Rosario MD      CC:   No Recipients  ______________________________________________________________________________________        [facility] Outpatient Follow-up Visit     Reason for Visit: CAD    HPI: Tuan Castro is a 73 y.o.  male who presents today for followup.     Patient is a 73-year-old male with a history of CAD, hypertension, dyslipidemia who initially presented with chest discomfort April 2021. EKG demonstrated normal sinus rhythm with reported dynamic changes in leads V1 through V3. Cardiac enzymes were negative. Cardiac CT demonstrated calcium score 248. Echocardiogram demonstrated ejection fraction 60 to 65%. He underwent cardiac catheterization which demonstrated mild CAD with a 70 to 80% stenosis in the mid to distal left posterior descending artery. He was medically managed. He had 1 or 2 other visits to the emergency department with lightheaded and chest discomfort. He underwent revascularization of the PDA. He admits to an occasional small twinge of chest discomfort.  He had 2 episodes of chest discomfort walking his dog after extremely stressful days.  He has since retired.  He has not had any other exertional symptoms.  He denies any shortness of breath. He denies any syncope, orthopnea, PND. He does admit to varicose veins and some dependent lower extremity edema.      Cardiac catheterization 5/21/2021: Successful FENG of the PDA, mild LAD and RCA disease.   Echocardiogram 2021: Ejection fraction 60 to 65%.   Cardiac CTA 2021: Coronary calcium score 248.   Holter  monitor demonstrates sinus rhythm, sinus bradycardia as well as SVT. Low heart rate is 51. High heart rate is 187.   MPI 3/23/2022: Normal perfusion, ejection fraction 54%.   1 week Holter monitor May 2022 with short runs of atrial tachycardia, otherwise sinus bradycardia to sinus tachycardia.  5/4/2023 , LDL 63, HDL 48, triglycerides 183.  6/9/2023 ECG: Normal sinus rhythm, left axis deviation.  7/31/2023 MPI: Normal perfusion, ejection fraction 63%.   3/12/2024 , LDL 82, HDL 51, triglycerides 98.    Past Medical History:   He has a past medical history of Pain in right knee (10/12/2021) and Personal history of other mental and behavioral disorders (07/25/2022).    Surgical History:   He has a past surgical history that includes Hernia repair (01/15/2016); Vasectomy (01/15/2016); Lithotripsy (01/15/2016); Colectomy (01/15/2016); Other surgical history (06/08/2021); Other surgical history (04/29/2021); Other surgical history (06/09/2017); and Tonsillectomy (06/09/2017).    Family History:   No family history on file.    Allergies:  Calamine-zinc oxide, Cymbalta [duloxetine], Erythromycin, Hydroxyzine, Trazodone, and Zolpidem     Social History:   No cigarettes or drugs.  Social alcohol.    Prior Cardiovascular Testing (Personally Reviewed):     Review of Systems:  Review of Systems   All other systems reviewed and are negative.      Outpatient Medications:    Current Outpatient Medications:   •  atorvastatin (Lipitor) 80 mg tablet, TAKE 1 TABLET BY MOUTH ONCE DAILY., Disp: 90 tablet, Rfl: 0  •  buPROPion (Wellbutrin) 75 mg tablet, TAKE 1 TABLET BY MOUTH TWICE A DAY, Disp: 180 tablet, Rfl: 0  •  cholecalciferol (Vitamin D3) 5,000 Units tablet, 1 tablet DAILY (route: oral), Disp: , Rfl:   •  clopidogrel (Plavix) 75 mg tablet, TAKE 1 TABLET BY MOUTH ONCE DAILY., Disp: 90 tablet, Rfl: 0  •  docusate sodium (Colace) 100 mg capsule, Take 1 capsule (100 mg) by mouth 2 times a day as needed., Disp: , Rfl:   •   escitalopram (Lexapro) 20 mg tablet, Take 1 tablet (20 mg) by mouth once daily., Disp: 90 tablet, Rfl: 1  •  fish oil concentrate (Omega-3) 120-180 mg capsule, Take 1 capsule (1 g) by mouth once daily., Disp: , Rfl:   •  gabapentin (Neurontin) 100 mg capsule, Take 1 capsule (100 mg) by mouth 3 times a day., Disp: 270 capsule, Rfl: 1  •  influenza vac high-dose quad (Fluzone HighDose Quad 20-21 PF) syringe syringe, Inject 0.7 mL into the muscle 1 time., Disp: , Rfl:   •  lactulose 20 gram/30 mL oral solution, Take 30 mL (20 g) by mouth once daily., Disp: , Rfl:   •  losartan (Cozaar) 100 mg tablet, Take 1 tablet (100 mg) by mouth once daily., Disp: 90 tablet, Rfl: 1  •  melatonin 10 mg capsule, Take 1 capsule (10 mg) by mouth as needed at bedtime for sleep., Disp: , Rfl:   •  multivitamin tablet, 1 tablet DAILY (route: oral), Disp: , Rfl:   •  nitroglycerin (Nitrostat) 0.4 mg SL tablet, Place 1 tablet (0.4 mg) under the tongue every 5 minutes if needed for chest pain., Disp: 90 tablet, Rfl: 3  •  omeprazole OTC (PriLOSEC OTC) 20 mg EC tablet, Take 1 tablet (20 mg) by mouth once daily., Disp: , Rfl:   •  traZODone (Desyrel) 50 mg tablet, Take 1 tablet (50 mg) by mouth as needed at bedtime for sleep., Disp: 30 tablet, Rfl: 1  •  vitamins A,C,E-zinc-copper (ICaps AREDS) 4,296 mcg-226 mg-90 mg capsule, Take by mouth., Disp: , Rfl:      Last Recorded Vitals  There were no vitals taken for this visit.    Physical Exam:    Physical Exam  Vitals reviewed.   Constitutional:       Appearance: Normal appearance.   HENT:      Head: Normocephalic and atraumatic.      Mouth/Throat:      Mouth: Mucous membranes are moist.      Pharynx: Oropharynx is clear.   Eyes:      Extraocular Movements: Extraocular movements intact.      Conjunctiva/sclera: Conjunctivae normal.   Cardiovascular:      Rate and Rhythm: Normal rate and regular rhythm.      Pulses: Normal pulses.      Heart sounds: Normal heart sounds.   Pulmonary:      Effort:  "Pulmonary effort is normal.      Breath sounds: Normal breath sounds.   Abdominal:      General: Bowel sounds are normal.      Palpations: Abdomen is soft.   Musculoskeletal:         General: No swelling.      Cervical back: Neck supple.   Skin:     General: Skin is warm and dry.   Neurological:      General: No focal deficit present.      Mental Status: He is alert.   Psychiatric:         Mood and Affect: Mood normal.         Behavior: Behavior normal.         Lab/Radiology/Diagnostic Review:    Labs    Lab Results   Component Value Date    GLUCOSE 113 (H) 04/01/2024    CALCIUM 9.7 04/01/2024     04/01/2024    K 4.5 04/01/2024    CO2 26 04/01/2024     04/01/2024    BUN 17 04/01/2024    CREATININE 0.99 04/01/2024       Lab Results   Component Value Date    WBC 11.4 (H) 04/01/2024    HGB 16.6 04/01/2024    HCT 50.5 04/01/2024    MCV 91 04/01/2024     04/01/2024       Lab Results   Component Value Date    CHOL 153 03/12/2024    CHOL 148 05/04/2023    CHOL 112 03/07/2022     Lab Results   Component Value Date    HDL 51.1 03/12/2024    HDL 48.2 05/04/2023    HDL 46.4 03/07/2022     Lab Results   Component Value Date    LDLCALC 82 03/12/2024     Lab Results   Component Value Date    TRIG 98 03/12/2024    TRIG 183 (H) 05/04/2023    TRIG 46 03/07/2022     No components found for: \"CHOLHDL\"    Lab Results   Component Value Date    BNP 32 07/12/2023    BNP 21 12/10/2021       Lab Results   Component Value Date    TSH 1.35 03/12/2024       Assessment:   The patient is doing reasonably well from a cardiac standpoint.      Patient will continue clopidogrel monotherapy for his CAD. He will continue statin therapy for CAD.     Patient will continue fish oil 1 g twice a day for elevated triglycerides.     Will continue losartan for hypertension.  I asked him to remain active to improve his blood pressure.  If the patient remains hypertensive at his next visit, we will likely have to start an additional " medication.     Patient's lower extremity edema has resolved.  He gets occasional dependent lower extremity edema.  He treats this conservatively with leg elevation.    I asked him to keep an eye on his symptoms.      Patient will follow up with me in 6 months or sooner if he has more problems.     Gerson Rosario MD

## 2024-06-14 NOTE — PROGRESS NOTES
[facility] Cardiology Outpatient Follow-up Visit     Reason for Visit: CAD    HPI: Tuan Castro is a 73 y.o.  male who presents today for followup.     Patient is a 73-year-old male with a history of CAD, hypertension, dyslipidemia who initially presented with chest discomfort April 2021. EKG demonstrated normal sinus rhythm with reported dynamic changes in leads V1 through V3. Cardiac enzymes were negative. Cardiac CT demonstrated calcium score 248. Echocardiogram demonstrated ejection fraction 60 to 65%. He underwent cardiac catheterization which demonstrated mild CAD with a 70 to 80% stenosis in the mid to distal left posterior descending artery. He was medically managed. He had 1 or 2 other visits to the emergency department with lightheaded and chest discomfort. He underwent revascularization of the PDA. He admits to an occasional small twinge of chest discomfort.  He had 2 episodes of chest discomfort walking his dog after extremely stressful days.  He has since retired.  He has not had any other exertional symptoms.  He denies any shortness of breath. He denies any syncope, orthopnea, PND. He does admit to varicose veins and some dependent lower extremity edema.      Cardiac catheterization 5/21/2021: Successful FENG of the PDA, mild LAD and RCA disease.   Echocardiogram 2021: Ejection fraction 60 to 65%.   Cardiac CTA 2021: Coronary calcium score 248.   Holter monitor demonstrates sinus rhythm, sinus bradycardia as well as SVT. Low heart rate is 51. High heart rate is 187.   MPI 3/23/2022: Normal perfusion, ejection fraction 54%.   1 week Holter monitor May 2022 with short runs of atrial tachycardia, otherwise sinus bradycardia to sinus tachycardia.  5/4/2023 , LDL 63, HDL 48, triglycerides 183.  6/9/2023 ECG: Normal sinus rhythm, left axis deviation.  7/31/2023 MPI: Normal perfusion, ejection fraction 63%.   3/12/2024 , LDL 82, HDL 51, triglycerides 98.    Past Medical History:   He  has a past medical history of Pain in right knee (10/12/2021) and Personal history of other mental and behavioral disorders (07/25/2022).    Surgical History:   He has a past surgical history that includes Hernia repair (01/15/2016); Vasectomy (01/15/2016); Lithotripsy (01/15/2016); Colectomy (01/15/2016); Other surgical history (06/08/2021); Other surgical history (04/29/2021); Other surgical history (06/09/2017); and Tonsillectomy (06/09/2017).    Family History:   No family history on file.    Allergies:  Calamine-zinc oxide, Cymbalta [duloxetine], Erythromycin, Hydroxyzine, Trazodone, and Zolpidem     Social History:   No cigarettes or drugs.  Social alcohol.    Prior Cardiovascular Testing (Personally Reviewed):     Review of Systems:  Review of Systems   All other systems reviewed and are negative.      Outpatient Medications:    Current Outpatient Medications:     atorvastatin (Lipitor) 80 mg tablet, TAKE 1 TABLET BY MOUTH ONCE DAILY., Disp: 90 tablet, Rfl: 0    buPROPion (Wellbutrin) 75 mg tablet, TAKE 1 TABLET BY MOUTH TWICE A DAY, Disp: 180 tablet, Rfl: 0    cholecalciferol (Vitamin D3) 5,000 Units tablet, 1 tablet DAILY (route: oral), Disp: , Rfl:     clopidogrel (Plavix) 75 mg tablet, TAKE 1 TABLET BY MOUTH ONCE DAILY., Disp: 90 tablet, Rfl: 0    docusate sodium (Colace) 100 mg capsule, Take 1 capsule (100 mg) by mouth 2 times a day as needed., Disp: , Rfl:     escitalopram (Lexapro) 20 mg tablet, Take 1 tablet (20 mg) by mouth once daily., Disp: 90 tablet, Rfl: 1    fish oil concentrate (Omega-3) 120-180 mg capsule, Take 1 capsule (1 g) by mouth once daily., Disp: , Rfl:     gabapentin (Neurontin) 100 mg capsule, Take 1 capsule (100 mg) by mouth 3 times a day., Disp: 270 capsule, Rfl: 1    influenza vac high-dose quad (Fluzone HighDose Quad 20-21 PF) syringe syringe, Inject 0.7 mL into the muscle 1 time., Disp: , Rfl:     lactulose 20 gram/30 mL oral solution, Take 30 mL (20 g) by mouth once daily.,  Disp: , Rfl:     losartan (Cozaar) 100 mg tablet, Take 1 tablet (100 mg) by mouth once daily., Disp: 90 tablet, Rfl: 1    melatonin 10 mg capsule, Take 1 capsule (10 mg) by mouth as needed at bedtime for sleep., Disp: , Rfl:     multivitamin tablet, 1 tablet DAILY (route: oral), Disp: , Rfl:     nitroglycerin (Nitrostat) 0.4 mg SL tablet, Place 1 tablet (0.4 mg) under the tongue every 5 minutes if needed for chest pain., Disp: 90 tablet, Rfl: 3    omeprazole OTC (PriLOSEC OTC) 20 mg EC tablet, Take 1 tablet (20 mg) by mouth once daily., Disp: , Rfl:     traZODone (Desyrel) 50 mg tablet, Take 1 tablet (50 mg) by mouth as needed at bedtime for sleep., Disp: 30 tablet, Rfl: 1    vitamins A,C,E-zinc-copper (ICaps AREDS) 4,296 mcg-226 mg-90 mg capsule, Take by mouth., Disp: , Rfl:      Last Recorded Vitals  There were no vitals taken for this visit.    Physical Exam:    Physical Exam  Vitals reviewed.   Constitutional:       Appearance: Normal appearance.   HENT:      Head: Normocephalic and atraumatic.      Mouth/Throat:      Mouth: Mucous membranes are moist.      Pharynx: Oropharynx is clear.   Eyes:      Extraocular Movements: Extraocular movements intact.      Conjunctiva/sclera: Conjunctivae normal.   Cardiovascular:      Rate and Rhythm: Normal rate and regular rhythm.      Pulses: Normal pulses.      Heart sounds: Normal heart sounds.   Pulmonary:      Effort: Pulmonary effort is normal.      Breath sounds: Normal breath sounds.   Abdominal:      General: Bowel sounds are normal.      Palpations: Abdomen is soft.   Musculoskeletal:         General: No swelling.      Cervical back: Neck supple.   Skin:     General: Skin is warm and dry.   Neurological:      General: No focal deficit present.      Mental Status: He is alert.   Psychiatric:         Mood and Affect: Mood normal.         Behavior: Behavior normal.         Lab/Radiology/Diagnostic Review:    Labs    Lab Results   Component Value Date    GLUCOSE 113 (H)  "04/01/2024    CALCIUM 9.7 04/01/2024     04/01/2024    K 4.5 04/01/2024    CO2 26 04/01/2024     04/01/2024    BUN 17 04/01/2024    CREATININE 0.99 04/01/2024       Lab Results   Component Value Date    WBC 11.4 (H) 04/01/2024    HGB 16.6 04/01/2024    HCT 50.5 04/01/2024    MCV 91 04/01/2024     04/01/2024       Lab Results   Component Value Date    CHOL 153 03/12/2024    CHOL 148 05/04/2023    CHOL 112 03/07/2022     Lab Results   Component Value Date    HDL 51.1 03/12/2024    HDL 48.2 05/04/2023    HDL 46.4 03/07/2022     Lab Results   Component Value Date    LDLCALC 82 03/12/2024     Lab Results   Component Value Date    TRIG 98 03/12/2024    TRIG 183 (H) 05/04/2023    TRIG 46 03/07/2022     No components found for: \"CHOLHDL\"    Lab Results   Component Value Date    BNP 32 07/12/2023    BNP 21 12/10/2021       Lab Results   Component Value Date    TSH 1.35 03/12/2024       Assessment:   The patient is doing reasonably well from a cardiac standpoint.      Patient will continue clopidogrel monotherapy for his CAD. He will continue statin therapy for CAD.     Patient will continue fish oil 1 g twice a day for elevated triglycerides.     Will continue losartan for hypertension.  I asked him to remain active to improve his blood pressure.  If the patient remains hypertensive at his next visit, we will likely have to start an additional medication.     Patient's lower extremity edema has resolved.  He gets occasional dependent lower extremity edema.  He treats this conservatively with leg elevation.    I asked him to keep an eye on his symptoms.      Patient will follow up with me in 6 months or sooner if he has more problems.     Gerson Rosario MD      "

## 2024-06-14 NOTE — LETTER
June 14, 2024     Abram Rodas MD  [address]  [facility] [address]    Patient: Tuan Castro   YOB: 1950   Date of Visit: 6/14/2024       Dear Dr. Abram Rodas MD:    Thank you for referring Tuan Castro to me for evaluation. Below are my notes for this consultation.  If you have questions, please do not hesitate to call me. I look forward to following your patient along with you.       Sincerely,     Gerson Rosario MD      CC: No Recipients  ______________________________________________________________________________________        [facility] Cardiology Outpatient Follow-up Visit     Reason for Visit: CAD    HPI: Tuan Castro is a 73 y.o.  male who presents today for followup.     Patient is a 73-year-old male with a history of CAD, hypertension, dyslipidemia who initially presented with chest discomfort April 2021. EKG demonstrated normal sinus rhythm with reported dynamic changes in leads V1 through V3. Cardiac enzymes were negative. Cardiac CT demonstrated calcium score 248. Echocardiogram demonstrated ejection fraction 60 to 65%. He underwent cardiac catheterization which demonstrated mild CAD with a 70 to 80% stenosis in the mid to distal left posterior descending artery. He was medically managed. He had 1 or 2 other visits to the emergency department with lightheaded and chest discomfort. He underwent revascularization of the PDA. He admits to an occasional small twinge of chest discomfort.  He had 2 episodes of chest discomfort walking his dog after extremely stressful days.  He has since retired.  He has not had any other exertional symptoms.  He denies any shortness of breath. He denies any syncope, orthopnea, PND. He does admit to varicose veins and some dependent lower extremity edema.      Cardiac catheterization 5/21/2021: Successful FENG of the PDA, mild LAD and RCA disease.   Echocardiogram  2021: Ejection fraction 60 to 65%.   Cardiac CTA 2021: Coronary calcium score 248.   Holter monitor demonstrates sinus rhythm, sinus bradycardia as well as SVT. Low heart rate is 51. High heart rate is 187.   MPI 3/23/2022: Normal perfusion, ejection fraction 54%.   1 week Holter monitor May 2022 with short runs of atrial tachycardia, otherwise sinus bradycardia to sinus tachycardia.  5/4/2023 , LDL 63, HDL 48, triglycerides 183.  6/9/2023 ECG: Normal sinus rhythm, left axis deviation.  7/31/2023 MPI: Normal perfusion, ejection fraction 63%.   3/12/2024 , LDL 82, HDL 51, triglycerides 98.    Past Medical History:   He has a past medical history of Pain in right knee (10/12/2021) and Personal history of other mental and behavioral disorders (07/25/2022).    Surgical History:   He has a past surgical history that includes Hernia repair (01/15/2016); Vasectomy (01/15/2016); Lithotripsy (01/15/2016); Colectomy (01/15/2016); Other surgical history (06/08/2021); Other surgical history (04/29/2021); Other surgical history (06/09/2017); and Tonsillectomy (06/09/2017).    Family History:   No family history on file.    Allergies:  Calamine-zinc oxide, Cymbalta [duloxetine], Erythromycin, Hydroxyzine, Trazodone, and Zolpidem     Social History:   No cigarettes or drugs.  Social alcohol.    Prior Cardiovascular Testing (Personally Reviewed):     Review of Systems:  Review of Systems   All other systems reviewed and are negative.      Outpatient Medications:    Current Outpatient Medications:   •  atorvastatin (Lipitor) 80 mg tablet, TAKE 1 TABLET BY MOUTH ONCE DAILY., Disp: 90 tablet, Rfl: 0  •  buPROPion (Wellbutrin) 75 mg tablet, TAKE 1 TABLET BY MOUTH TWICE A DAY, Disp: 180 tablet, Rfl: 0  •  cholecalciferol (Vitamin D3) 5,000 Units tablet, 1 tablet DAILY (route: oral), Disp: , Rfl:   •  clopidogrel (Plavix) 75 mg tablet, TAKE 1 TABLET BY MOUTH ONCE DAILY., Disp: 90 tablet, Rfl: 0  •  docusate sodium (Colace)  100 mg capsule, Take 1 capsule (100 mg) by mouth 2 times a day as needed., Disp: , Rfl:   •  escitalopram (Lexapro) 20 mg tablet, Take 1 tablet (20 mg) by mouth once daily., Disp: 90 tablet, Rfl: 1  •  fish oil concentrate (Omega-3) 120-180 mg capsule, Take 1 capsule (1 g) by mouth once daily., Disp: , Rfl:   •  gabapentin (Neurontin) 100 mg capsule, Take 1 capsule (100 mg) by mouth 3 times a day., Disp: 270 capsule, Rfl: 1  •  influenza vac high-dose quad (Fluzone HighDose Quad 20-21 PF) syringe syringe, Inject 0.7 mL into the muscle 1 time., Disp: , Rfl:   •  lactulose 20 gram/30 mL oral solution, Take 30 mL (20 g) by mouth once daily., Disp: , Rfl:   •  losartan (Cozaar) 100 mg tablet, Take 1 tablet (100 mg) by mouth once daily., Disp: 90 tablet, Rfl: 1  •  melatonin 10 mg capsule, Take 1 capsule (10 mg) by mouth as needed at bedtime for sleep., Disp: , Rfl:   •  multivitamin tablet, 1 tablet DAILY (route: oral), Disp: , Rfl:   •  nitroglycerin (Nitrostat) 0.4 mg SL tablet, Place 1 tablet (0.4 mg) under the tongue every 5 minutes if needed for chest pain., Disp: 90 tablet, Rfl: 3  •  omeprazole OTC (PriLOSEC OTC) 20 mg EC tablet, Take 1 tablet (20 mg) by mouth once daily., Disp: , Rfl:   •  traZODone (Desyrel) 50 mg tablet, Take 1 tablet (50 mg) by mouth as needed at bedtime for sleep., Disp: 30 tablet, Rfl: 1  •  vitamins A,C,E-zinc-copper (ICaps AREDS) 4,296 mcg-226 mg-90 mg capsule, Take by mouth., Disp: , Rfl:      Last Recorded Vitals  There were no vitals taken for this visit.    Physical Exam:    Physical Exam  Vitals reviewed.   Constitutional:       Appearance: Normal appearance.   HENT:      Head: Normocephalic and atraumatic.      Mouth/Throat:      Mouth: Mucous membranes are moist.      Pharynx: Oropharynx is clear.   Eyes:      Extraocular Movements: Extraocular movements intact.      Conjunctiva/sclera: Conjunctivae normal.   Cardiovascular:      Rate and Rhythm: Normal rate and regular rhythm.      " Pulses: Normal pulses.      Heart sounds: Normal heart sounds.   Pulmonary:      Effort: Pulmonary effort is normal.      Breath sounds: Normal breath sounds.   Abdominal:      General: Bowel sounds are normal.      Palpations: Abdomen is soft.   Musculoskeletal:         General: No swelling.      Cervical back: Neck supple.   Skin:     General: Skin is warm and dry.   Neurological:      General: No focal deficit present.      Mental Status: He is alert.   Psychiatric:         Mood and Affect: Mood normal.         Behavior: Behavior normal.         Lab/Radiology/Diagnostic Review:    Labs    Lab Results   Component Value Date    GLUCOSE 113 (H) 04/01/2024    CALCIUM 9.7 04/01/2024     04/01/2024    K 4.5 04/01/2024    CO2 26 04/01/2024     04/01/2024    BUN 17 04/01/2024    CREATININE 0.99 04/01/2024       Lab Results   Component Value Date    WBC 11.4 (H) 04/01/2024    HGB 16.6 04/01/2024    HCT 50.5 04/01/2024    MCV 91 04/01/2024     04/01/2024       Lab Results   Component Value Date    CHOL 153 03/12/2024    CHOL 148 05/04/2023    CHOL 112 03/07/2022     Lab Results   Component Value Date    HDL 51.1 03/12/2024    HDL 48.2 05/04/2023    HDL 46.4 03/07/2022     Lab Results   Component Value Date    LDLCALC 82 03/12/2024     Lab Results   Component Value Date    TRIG 98 03/12/2024    TRIG 183 (H) 05/04/2023    TRIG 46 03/07/2022     No components found for: \"CHOLHDL\"    Lab Results   Component Value Date    BNP 32 07/12/2023    BNP 21 12/10/2021       Lab Results   Component Value Date    TSH 1.35 03/12/2024       Assessment:   The patient is doing reasonably well from a cardiac standpoint.      Patient will continue clopidogrel monotherapy for his CAD. He will continue statin therapy for CAD.     Patient will continue fish oil 1 g twice a day for elevated triglycerides.     Will continue losartan for hypertension.  I asked him to remain active to improve his blood pressure.  If the patient " remains hypertensive at his next visit, we will likely have to start an additional medication.     Patient's lower extremity edema has resolved.  He gets occasional dependent lower extremity edema.  He treats this conservatively with leg elevation.    I asked him to keep an eye on his symptoms.      Patient will follow up with me in 6 months or sooner if he has more problems.     Gerson Rosario MD

## 2024-06-17 ENCOUNTER — APPOINTMENT (OUTPATIENT)
Dept: ORTHOPEDIC SURGERY | Facility: CLINIC | Age: 74
End: 2024-06-17
Payer: COMMERCIAL

## 2024-06-17 ENCOUNTER — HOSPITAL ENCOUNTER (OUTPATIENT)
Dept: RADIOLOGY | Facility: CLINIC | Age: 74
Discharge: HOME | End: 2024-06-17
Payer: COMMERCIAL

## 2024-06-17 VITALS — HEIGHT: 71 IN | BODY MASS INDEX: 28 KG/M2 | WEIGHT: 200 LBS

## 2024-06-17 DIAGNOSIS — M79.642 LEFT HAND PAIN: Primary | ICD-10-CM

## 2024-06-17 DIAGNOSIS — M79.642 LEFT HAND PAIN: ICD-10-CM

## 2024-06-17 PROCEDURE — 73130 X-RAY EXAM OF HAND: CPT | Mod: LEFT SIDE | Performed by: RADIOLOGY

## 2024-06-17 PROCEDURE — 99213 OFFICE O/P EST LOW 20 MIN: CPT | Performed by: ORTHOPAEDIC SURGERY

## 2024-06-17 PROCEDURE — 1036F TOBACCO NON-USER: CPT | Performed by: ORTHOPAEDIC SURGERY

## 2024-06-17 PROCEDURE — 73130 X-RAY EXAM OF HAND: CPT | Mod: LT

## 2024-06-17 NOTE — PROGRESS NOTES
Subjective    Patient ID: Tuan Castro is a 73 y.o. male.    Chief Complaint: Pain and Injury of the Left Hand (X7 MONTHS/FALL)     Last Surgery: No surgery found  Last Surgery Date: No surgery found    HEATHER  Is a 73-year-old right-hand-dominant male who comes with a complaint of left hand pain.  Most of the pain is near the base of his left thumb.  He states his pain was worsened when he fell onto his left hand about 6 months ago.  Since then his pain has waxed and waned.  He has used a combination of Tylenol and ibuprofen.    Objective   Ortho Exam  Patient is in no acute distress.  Exam of his left hand and wrist reveals his skin envelope is intact.  He is mildly tender at the first CMC joint.  He has a negative first CMC grind.  He has a negative Finkelstein test.  There is no evidence of a trigger thumb.    Image Results:  X-rays of his left hand were personally reviewed today.  They show evidence of moderate arthritic changes at the first CMC joint.  There is no fracture or dislocation.    Assessment/Plan   Encounter Diagnoses:  Left hand pain    Orders Placed This Encounter    XR hand left 3+ views     Patient has left hand pain likely secondary to aggravation of left first CMC arthritis with his fall.  At this time he would prefer to use over-the-counter anti-inflammatories or acetaminophen.  He will follow-up as his symptoms dictate.

## 2024-06-18 DIAGNOSIS — I10 ESSENTIAL (PRIMARY) HYPERTENSION: ICD-10-CM

## 2024-06-18 RX ORDER — LOSARTAN POTASSIUM 100 MG/1
100 TABLET ORAL DAILY
Qty: 90 TABLET | Refills: 0 | Status: SHIPPED | OUTPATIENT
Start: 2024-06-18

## 2024-07-08 ENCOUNTER — CLINICAL SUPPORT (OUTPATIENT)
Dept: AUDIOLOGY | Facility: CLINIC | Age: 74
End: 2024-07-08
Payer: COMMERCIAL

## 2024-07-08 ENCOUNTER — OFFICE VISIT (OUTPATIENT)
Dept: OTOLARYNGOLOGY | Facility: CLINIC | Age: 74
End: 2024-07-08
Payer: COMMERCIAL

## 2024-07-08 VITALS
HEIGHT: 70 IN | HEART RATE: 81 BPM | WEIGHT: 202 LBS | TEMPERATURE: 99.1 F | DIASTOLIC BLOOD PRESSURE: 76 MMHG | SYSTOLIC BLOOD PRESSURE: 130 MMHG | BODY MASS INDEX: 28.92 KG/M2

## 2024-07-08 DIAGNOSIS — H93.13 TINNITUS, BILATERAL: ICD-10-CM

## 2024-07-08 DIAGNOSIS — H93.13 TINNITUS OF BOTH EARS: ICD-10-CM

## 2024-07-08 DIAGNOSIS — H90.3 ASYMMETRICAL SENSORINEURAL HEARING LOSS: Primary | ICD-10-CM

## 2024-07-08 DIAGNOSIS — H83.3X3 ACOUSTIC TRAUMA OF BOTH EARS: ICD-10-CM

## 2024-07-08 DIAGNOSIS — H90.3 SNHL (SENSORY-NEURAL HEARING LOSS), ASYMMETRICAL: Primary | ICD-10-CM

## 2024-07-08 DIAGNOSIS — H83.3X1 ACOUSTIC TRAUMA OF RIGHT EAR: ICD-10-CM

## 2024-07-08 PROBLEM — R00.8 SUPRAVENTRICULAR BIGEMINY: Status: ACTIVE | Noted: 2024-07-08

## 2024-07-08 PROCEDURE — 1159F MED LIST DOCD IN RCRD: CPT | Performed by: NURSE PRACTITIONER

## 2024-07-08 PROCEDURE — 1126F AMNT PAIN NOTED NONE PRSNT: CPT | Performed by: NURSE PRACTITIONER

## 2024-07-08 PROCEDURE — 99203 OFFICE O/P NEW LOW 30 MIN: CPT | Performed by: NURSE PRACTITIONER

## 2024-07-08 PROCEDURE — 3078F DIAST BP <80 MM HG: CPT | Performed by: NURSE PRACTITIONER

## 2024-07-08 PROCEDURE — 3075F SYST BP GE 130 - 139MM HG: CPT | Performed by: NURSE PRACTITIONER

## 2024-07-08 PROCEDURE — 99213 OFFICE O/P EST LOW 20 MIN: CPT | Performed by: NURSE PRACTITIONER

## 2024-07-08 PROCEDURE — 92550 TYMPANOMETRY & REFLEX THRESH: CPT | Performed by: AUDIOLOGIST

## 2024-07-08 PROCEDURE — 92557 COMPREHENSIVE HEARING TEST: CPT | Performed by: AUDIOLOGIST

## 2024-07-08 PROCEDURE — 1036F TOBACCO NON-USER: CPT | Performed by: NURSE PRACTITIONER

## 2024-07-08 SDOH — ECONOMIC STABILITY: FOOD INSECURITY: WITHIN THE PAST 12 MONTHS, THE FOOD YOU BOUGHT JUST DIDN'T LAST AND YOU DIDN'T HAVE MONEY TO GET MORE.: NEVER TRUE

## 2024-07-08 SDOH — ECONOMIC STABILITY: FOOD INSECURITY: WITHIN THE PAST 12 MONTHS, YOU WORRIED THAT YOUR FOOD WOULD RUN OUT BEFORE YOU GOT MONEY TO BUY MORE.: NEVER TRUE

## 2024-07-08 ASSESSMENT — LIFESTYLE VARIABLES
HOW MANY STANDARD DRINKS CONTAINING ALCOHOL DO YOU HAVE ON A TYPICAL DAY: 1 OR 2
AUDIT-C TOTAL SCORE: 1
HOW OFTEN DO YOU HAVE A DRINK CONTAINING ALCOHOL: MONTHLY OR LESS
HOW OFTEN DO YOU HAVE SIX OR MORE DRINKS ON ONE OCCASION: NEVER
SKIP TO QUESTIONS 9-10: 1

## 2024-07-08 ASSESSMENT — PATIENT HEALTH QUESTIONNAIRE - PHQ9
2. FEELING DOWN, DEPRESSED OR HOPELESS: NOT AT ALL
SUM OF ALL RESPONSES TO PHQ9 QUESTIONS 1 AND 2: 0
1. LITTLE INTEREST OR PLEASURE IN DOING THINGS: NOT AT ALL

## 2024-07-08 ASSESSMENT — COLUMBIA-SUICIDE SEVERITY RATING SCALE - C-SSRS
1. IN THE PAST MONTH, HAVE YOU WISHED YOU WERE DEAD OR WISHED YOU COULD GO TO SLEEP AND NOT WAKE UP?: NO
2. HAVE YOU ACTUALLY HAD ANY THOUGHTS OF KILLING YOURSELF?: NO
6. HAVE YOU EVER DONE ANYTHING, STARTED TO DO ANYTHING, OR PREPARED TO DO ANYTHING TO END YOUR LIFE?: NO

## 2024-07-08 ASSESSMENT — ENCOUNTER SYMPTOMS
OCCASIONAL FEELINGS OF UNSTEADINESS: 0
DEPRESSION: 0
LOSS OF SENSATION IN FEET: 0

## 2024-07-08 ASSESSMENT — PAIN SCALES - GENERAL: PAINLEVEL: 0-NO PAIN
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
## 2024-07-11 ENCOUNTER — APPOINTMENT (OUTPATIENT)
Dept: OTOLARYNGOLOGY | Facility: CLINIC | Age: 74
End: 2024-07-11
Payer: COMMERCIAL

## 2024-08-06 ENCOUNTER — CLINICAL SUPPORT (OUTPATIENT)
Dept: AUDIOLOGY | Facility: CLINIC | Age: 74
End: 2024-08-06

## 2024-08-06 DIAGNOSIS — H90.3 SNHL (SENSORY-NEURAL HEARING LOSS), ASYMMETRICAL: Primary | ICD-10-CM

## 2024-08-06 PROCEDURE — V5261 HEARING AID, DIGIT, BIN, BTE: HCPCS | Mod: AUDSP | Performed by: AUDIOLOGIST

## 2024-08-06 PROCEDURE — V5160 DISPENSING FEE BINAURAL: HCPCS | Mod: AUDSP | Performed by: AUDIOLOGIST

## 2024-08-06 NOTE — PROGRESS NOTES
Hearing Aid Evaluation    Tuan Castro, a 73 y.o.-old man, was seen today for a Hearing Aid Evaluation.      Patient reports that frequent listening environments include:  Watching televison  Walking dog  Talking with people outdoors.   Restaurant, alone  Social situations once or twice per week       He has not used hearing aids before.  Patient denies excessive cerumen, issues with manual dexterity, or vision problems (besides use of glasses).    Uses a flip phone.        Phonak Audeo L90-R hearing aids were demonsrated with medium vented domes.  Patient reported aids   - sounds are loud   - does not notice tinnitus    Rec.: Phonak Audeo P90-RT  Color P6  Length 1 receivers, right and left  Medium vented domes    Follow up in at least 2 weeks for fitting of hearing aids.

## 2024-08-20 ENCOUNTER — APPOINTMENT (OUTPATIENT)
Dept: AUDIOLOGY | Facility: CLINIC | Age: 74
End: 2024-08-20
Payer: COMMERCIAL

## 2024-08-22 DIAGNOSIS — I25.10 CORONARY ARTERY DISEASE INVOLVING NATIVE HEART, UNSPECIFIED VESSEL OR LESION TYPE, UNSPECIFIED WHETHER ANGINA PRESENT: ICD-10-CM

## 2024-08-22 RX ORDER — CLOPIDOGREL BISULFATE 75 MG/1
75 TABLET ORAL DAILY
Qty: 90 TABLET | Refills: 1 | Status: SHIPPED | OUTPATIENT
Start: 2024-08-22

## 2024-08-28 ENCOUNTER — CLINICAL SUPPORT (OUTPATIENT)
Dept: AUDIOLOGY | Facility: CLINIC | Age: 74
End: 2024-08-28
Payer: COMMERCIAL

## 2024-08-28 DIAGNOSIS — H90.3 SNHL (SENSORY-NEURAL HEARING LOSS), ASYMMETRICAL: Primary | ICD-10-CM

## 2024-08-28 NOTE — PROGRESS NOTES
Hearing Aid Fitting  Tuan Castro was seen today for a hearing aid fitting    Phonak Audeo P90-R  Small vented domes right and left     Otoscopic inspection showed: Right Ear Clear canals and tympanic membranes were visualized                     Left Ear Clear canals and tympanic membranes were visualized                       Aids were set to a target gain of 100 %  Fitting formula: other: phonak proprietary  Feedback manager was run today.   Battery Size:  rechargeable    Summary of Patient Education: The hearing aids are a good physical fit  Instructed on care and use of the rechargeable battery system   Instructed on care and use of the hearing aids   Written manual and user guide provided  Practiced insertion and removal of hearing aids  Warranty information, loss and damage protocol, and the trial period were explained  Out-of-pocket costs for ongoing maintenance and programming of the aids after the initial 90 day period was reviewed  Purchase agreement was signed and dated by: patient and clinician  Follow up appointment scheduled on: 9/16/2024    - noticing some echo, but expects he can adapt to it.

## 2024-09-03 ENCOUNTER — APPOINTMENT (OUTPATIENT)
Dept: AUDIOLOGY | Facility: CLINIC | Age: 74
End: 2024-09-03
Payer: COMMERCIAL

## 2024-09-09 ENCOUNTER — CLINICAL SUPPORT (OUTPATIENT)
Dept: AUDIOLOGY | Facility: CLINIC | Age: 74
End: 2024-09-09
Payer: COMMERCIAL

## 2024-09-09 DIAGNOSIS — H90.3 SNHL (SENSORY-NEURAL HEARING LOSS), ASYMMETRICAL: Primary | ICD-10-CM

## 2024-09-09 NOTE — PROGRESS NOTES
Hearing aid check.    Hashtrack P90-R  M receivers with Cerustops  medium vented domes right and left (changed from small today 9/9/2024 due to retention)   Fit 8/28/2024  Recommended changing of domes and wax protectors monthly or as needed.      Patient reports aids dead, VC tones audible.      Listening check indicated aids weak bilaterally.  Changed wax protectors with improvement; patient practiced.  Changed domes to medium as patient had bought medium on [employer] and reported retention was better.  Recommended against purchasing hearing aid parts on [employer].      No charge (in 1st 90 day period)    Rec.: Contiue with Hearing Aid Check on 9/16/2024 (or sooner as needed)

## 2024-09-16 ENCOUNTER — CLINICAL SUPPORT (OUTPATIENT)
Dept: AUDIOLOGY | Facility: CLINIC | Age: 74
End: 2024-09-16
Payer: COMMERCIAL

## 2024-09-16 DIAGNOSIS — H90.3 SNHL (SENSORY-NEURAL HEARING LOSS), ASYMMETRICAL: Primary | ICD-10-CM

## 2024-09-16 NOTE — PROGRESS NOTES
Hearing Aid Check     [employer] P90-R  M receivers with Cerustops  small vented domes right and left (tried medium for improved retention however insertion was shallow; added retention tab and returned back to small 9/16/2024)   Fit 8/28/2024  Recommended changing of domes and wax protectors monthly or as needed.      Patient reports good benefit from aids; sometimes turning aids up.    - real-ear measures were completed today.    - Aided gain measures were obtained.      No charge, within first 90 days after fit.      Rec.:  Hearing aid check as needed.

## 2024-10-07 ENCOUNTER — HOSPITAL ENCOUNTER (EMERGENCY)
Facility: HOSPITAL | Age: 74
Discharge: HOME | End: 2024-10-07
Attending: STUDENT IN AN ORGANIZED HEALTH CARE EDUCATION/TRAINING PROGRAM
Payer: COMMERCIAL

## 2024-10-07 ENCOUNTER — HOSPITAL ENCOUNTER (OUTPATIENT)
Dept: CARDIOLOGY | Facility: HOSPITAL | Age: 74
Discharge: HOME | End: 2024-10-07
Payer: COMMERCIAL

## 2024-10-07 ENCOUNTER — APPOINTMENT (OUTPATIENT)
Dept: CARDIOLOGY | Facility: HOSPITAL | Age: 74
End: 2024-10-07
Payer: COMMERCIAL

## 2024-10-07 ENCOUNTER — APPOINTMENT (OUTPATIENT)
Dept: RADIOLOGY | Facility: HOSPITAL | Age: 74
End: 2024-10-07
Payer: COMMERCIAL

## 2024-10-07 VITALS
DIASTOLIC BLOOD PRESSURE: 89 MMHG | RESPIRATION RATE: 16 BRPM | WEIGHT: 200 LBS | BODY MASS INDEX: 28.63 KG/M2 | OXYGEN SATURATION: 96 % | HEART RATE: 55 BPM | SYSTOLIC BLOOD PRESSURE: 127 MMHG | HEIGHT: 70 IN | TEMPERATURE: 97.5 F

## 2024-10-07 DIAGNOSIS — R07.9 CHEST PAIN, UNSPECIFIED TYPE: Primary | ICD-10-CM

## 2024-10-07 LAB
ALBUMIN SERPL BCP-MCNC: 4.3 G/DL (ref 3.4–5)
ALP SERPL-CCNC: 89 U/L (ref 33–136)
ALT SERPL W P-5'-P-CCNC: 25 U/L (ref 10–52)
ANION GAP SERPL CALC-SCNC: 9 MMOL/L (ref 10–20)
APTT PPP: 32 SECONDS (ref 27–38)
AST SERPL W P-5'-P-CCNC: 17 U/L (ref 9–39)
BASOPHILS # BLD AUTO: 0.04 X10*3/UL (ref 0–0.1)
BASOPHILS NFR BLD AUTO: 0.6 %
BILIRUB SERPL-MCNC: 0.7 MG/DL (ref 0–1.2)
BUN SERPL-MCNC: 13 MG/DL (ref 6–23)
CALCIUM SERPL-MCNC: 9.3 MG/DL (ref 8.6–10.3)
CARDIAC TROPONIN I PNL SERPL HS: 5 NG/L (ref 0–20)
CARDIAC TROPONIN I PNL SERPL HS: 6 NG/L (ref 0–20)
CHLORIDE SERPL-SCNC: 107 MMOL/L (ref 98–107)
CO2 SERPL-SCNC: 29 MMOL/L (ref 21–32)
CREAT SERPL-MCNC: 1.03 MG/DL (ref 0.5–1.3)
D DIMER PPP FEU-MCNC: 1239 NG/ML FEU
EGFRCR SERPLBLD CKD-EPI 2021: 77 ML/MIN/1.73M*2
EOSINOPHIL # BLD AUTO: 0.34 X10*3/UL (ref 0–0.4)
EOSINOPHIL NFR BLD AUTO: 5 %
ERYTHROCYTE [DISTWIDTH] IN BLOOD BY AUTOMATED COUNT: 12.8 % (ref 11.5–14.5)
GLUCOSE SERPL-MCNC: 82 MG/DL (ref 74–99)
HCT VFR BLD AUTO: 44.9 % (ref 41–52)
HGB BLD-MCNC: 15.6 G/DL (ref 13.5–17.5)
IMM GRANULOCYTES # BLD AUTO: 0.03 X10*3/UL (ref 0–0.5)
IMM GRANULOCYTES NFR BLD AUTO: 0.4 % (ref 0–0.9)
INR PPP: 1 (ref 0.9–1.1)
LYMPHOCYTES # BLD AUTO: 1.66 X10*3/UL (ref 0.8–3)
LYMPHOCYTES NFR BLD AUTO: 24.3 %
MAGNESIUM SERPL-MCNC: 1.72 MG/DL (ref 1.6–2.4)
MCH RBC QN AUTO: 31.6 PG (ref 26–34)
MCHC RBC AUTO-ENTMCNC: 34.7 G/DL (ref 32–36)
MCV RBC AUTO: 91 FL (ref 80–100)
MONOCYTES # BLD AUTO: 0.73 X10*3/UL (ref 0.05–0.8)
MONOCYTES NFR BLD AUTO: 10.7 %
NEUTROPHILS # BLD AUTO: 4.03 X10*3/UL (ref 1.6–5.5)
NEUTROPHILS NFR BLD AUTO: 59 %
NRBC BLD-RTO: 0 /100 WBCS (ref 0–0)
PLATELET # BLD AUTO: 220 X10*3/UL (ref 150–450)
POTASSIUM SERPL-SCNC: 4.1 MMOL/L (ref 3.5–5.3)
PROT SERPL-MCNC: 7.1 G/DL (ref 6.4–8.2)
PROTHROMBIN TIME: 11.6 SECONDS (ref 9.8–12.8)
RBC # BLD AUTO: 4.93 X10*6/UL (ref 4.5–5.9)
SODIUM SERPL-SCNC: 141 MMOL/L (ref 136–145)
WBC # BLD AUTO: 6.8 X10*3/UL (ref 4.4–11.3)

## 2024-10-07 PROCEDURE — 93005 ELECTROCARDIOGRAM TRACING: CPT

## 2024-10-07 PROCEDURE — 36415 COLL VENOUS BLD VENIPUNCTURE: CPT | Performed by: EMERGENCY MEDICINE

## 2024-10-07 PROCEDURE — 80053 COMPREHEN METABOLIC PANEL: CPT | Performed by: EMERGENCY MEDICINE

## 2024-10-07 PROCEDURE — 71046 X-RAY EXAM CHEST 2 VIEWS: CPT

## 2024-10-07 PROCEDURE — 71275 CT ANGIOGRAPHY CHEST: CPT

## 2024-10-07 PROCEDURE — 85379 FIBRIN DEGRADATION QUANT: CPT | Performed by: NURSE PRACTITIONER

## 2024-10-07 PROCEDURE — 36415 COLL VENOUS BLD VENIPUNCTURE: CPT | Performed by: NURSE PRACTITIONER

## 2024-10-07 PROCEDURE — 71275 CT ANGIOGRAPHY CHEST: CPT | Performed by: RADIOLOGY

## 2024-10-07 PROCEDURE — 85025 COMPLETE CBC W/AUTO DIFF WBC: CPT | Performed by: EMERGENCY MEDICINE

## 2024-10-07 PROCEDURE — 83735 ASSAY OF MAGNESIUM: CPT | Performed by: EMERGENCY MEDICINE

## 2024-10-07 PROCEDURE — 2550000001 HC RX 255 CONTRASTS: Performed by: NURSE PRACTITIONER

## 2024-10-07 PROCEDURE — 99285 EMERGENCY DEPT VISIT HI MDM: CPT | Mod: 25

## 2024-10-07 PROCEDURE — 84484 ASSAY OF TROPONIN QUANT: CPT | Performed by: EMERGENCY MEDICINE

## 2024-10-07 PROCEDURE — 71046 X-RAY EXAM CHEST 2 VIEWS: CPT | Performed by: RADIOLOGY

## 2024-10-07 PROCEDURE — 85730 THROMBOPLASTIN TIME PARTIAL: CPT | Performed by: EMERGENCY MEDICINE

## 2024-10-07 PROCEDURE — 84484 ASSAY OF TROPONIN QUANT: CPT | Performed by: NURSE PRACTITIONER

## 2024-10-07 ASSESSMENT — LIFESTYLE VARIABLES
TOTAL SCORE: 0
EVER FELT BAD OR GUILTY ABOUT YOUR DRINKING: NO
EVER HAD A DRINK FIRST THING IN THE MORNING TO STEADY YOUR NERVES TO GET RID OF A HANGOVER: NO
HAVE YOU EVER FELT YOU SHOULD CUT DOWN ON YOUR DRINKING: NO
HAVE PEOPLE ANNOYED YOU BY CRITICIZING YOUR DRINKING: NO

## 2024-10-07 ASSESSMENT — PAIN DESCRIPTION - PAIN TYPE: TYPE: ACUTE PAIN

## 2024-10-07 ASSESSMENT — PAIN DESCRIPTION - ORIENTATION: ORIENTATION: MID

## 2024-10-07 ASSESSMENT — COLUMBIA-SUICIDE SEVERITY RATING SCALE - C-SSRS
1. IN THE PAST MONTH, HAVE YOU WISHED YOU WERE DEAD OR WISHED YOU COULD GO TO SLEEP AND NOT WAKE UP?: NO
2. HAVE YOU ACTUALLY HAD ANY THOUGHTS OF KILLING YOURSELF?: NO
6. HAVE YOU EVER DONE ANYTHING, STARTED TO DO ANYTHING, OR PREPARED TO DO ANYTHING TO END YOUR LIFE?: NO
1. IN THE PAST MONTH, HAVE YOU WISHED YOU WERE DEAD OR WISHED YOU COULD GO TO SLEEP AND NOT WAKE UP?: NO
2. HAVE YOU ACTUALLY HAD ANY THOUGHTS OF KILLING YOURSELF?: NO
2. HAVE YOU ACTUALLY HAD ANY THOUGHTS OF KILLING YOURSELF?: NO
6. HAVE YOU EVER DONE ANYTHING, STARTED TO DO ANYTHING, OR PREPARED TO DO ANYTHING TO END YOUR LIFE?: NO
6. HAVE YOU EVER DONE ANYTHING, STARTED TO DO ANYTHING, OR PREPARED TO DO ANYTHING TO END YOUR LIFE?: NO
1. IN THE PAST MONTH, HAVE YOU WISHED YOU WERE DEAD OR WISHED YOU COULD GO TO SLEEP AND NOT WAKE UP?: NO

## 2024-10-07 ASSESSMENT — PAIN DESCRIPTION - ONSET: ONSET: SUDDEN

## 2024-10-07 ASSESSMENT — PAIN DESCRIPTION - DESCRIPTORS: DESCRIPTORS: PRESSURE;RADIATING;SHARP;SHOOTING

## 2024-10-07 ASSESSMENT — PAIN SCALES - GENERAL
PAINLEVEL_OUTOF10: 0 - NO PAIN
PAINLEVEL_OUTOF10: 0 - NO PAIN
PAINLEVEL_OUTOF10: 7

## 2024-10-07 ASSESSMENT — PAIN - FUNCTIONAL ASSESSMENT
PAIN_FUNCTIONAL_ASSESSMENT: 0-10
PAIN_FUNCTIONAL_ASSESSMENT: 0-10

## 2024-10-07 ASSESSMENT — PAIN DESCRIPTION - LOCATION: LOCATION: CHEST

## 2024-10-07 ASSESSMENT — PAIN DESCRIPTION - FREQUENCY: FREQUENCY: CONSTANT/CONTINUOUS

## 2024-10-07 ASSESSMENT — PAIN DESCRIPTION - PROGRESSION: CLINICAL_PROGRESSION: NOT CHANGED

## 2024-10-07 NOTE — ED TRIAGE NOTES
Patient states hx of heart attack 4/2001, stent placed 5/2001, now having similar chest pain that will not go away for the past 2 hours

## 2024-10-07 NOTE — ED TRIAGE NOTES
TRIAGE NOTE   I saw the patient as the Clinician in Triage and performed a brief history and physical exam, established acuity, and ordered appropriate tests to develop basic plan of care. Patient will be seen by an KELLY, resident and/or physician who will independently evaluate the patient. Please see subsequent provider notes for further details and disposition.     Brief HPI: In brief, Tuan Castro is a 73 y.o. male with significant past medical history for hypertension, prior MI with stent placement on Plavix presenting to ED today from home by himself for evaluation of left-sided chest pain.  Patient has had intermittent left-sided chest pain over the last several months that usually lasts 10 to 15 seconds.  Today the left-sided chest pain lasted approximately 15 minutes.  No radiation of pain.  Now the patient has a mild aching pressure in the left side of the chest.  Reports intermittent racing of the heart.  8 weeks ago long trip to [place].  No history of PE/DVT, recent surgery, history of malignancy or use of exogenous hormones.  Denies fever/chills, cough/cold symptoms, shortness of breath, nausea/vomiting, abdominal pain, urinary symptoms, change in bowel habits or any other complaints.  No smoking, EtOH or drug use.  PCP is Dr. Abram Rodas.  Cardiologist is Dr. Rosario.    Focused Physical exam:   General: Well-appearing 73-year-old  male, awake and alert, oriented x 3.  Well-nourished and hydrated.  Skin: Pink, warm and dry.  Cardiac: Regular rate and rhythm.  Chest pain not reproducible.  Pulmonary: Lungs clear bilaterally.    Plan/MDM:   73-year-old male with history of HTN and prior MI with stent placement on Plavix is evaluated at the bedside for left-sided chest pain.  Vital signs within normal limits.  Afebrile.  Lungs clear, abdomen soft and nontender.  IV established, basic labs including troponin x 2 and D-dimer, chest x-ray and EKG will be performed in preparation for  further evaluation in the main ED.  Patient is agreeable to this plan.    1333: Laboratory studies were reviewed, no leukocytosis.  Normal kidney function and electrolytes including magnesium.  Normal coags.  Troponins normal x 2.  D-dimer elevated at 1,239.  CT angio of the chest added to workup to rule out PE.  Patient is updated on treatment plan.    Please see subsequent provider note for further details and disposition

## 2024-10-07 NOTE — ED PROVIDER NOTES
History of Present Illness     History provided by: Patient  Limitations to History: None  External Records Reviewed with Brief Summary: Outpatient Labs from 7/28/23 which showed stress test, negative    HPI:  Tuan Castro is a 73 y.o. male with a past medical history of HTN, HLD, and CAD s/p stent on Plavix who presents with chest pain.  Patient notes that this morning he was laying in bed he experienced left-sided chest pain that did not radiate.  Notes that was constant.  No lightheadedness, nausea or shortness of breath with this.  No fevers.  States he was on his baseline health prior to this.  Has had intermittent episodes in the past that usually last for about 5 to 10 seconds.  This will last for 15 minutes which concerned him.    Physical Exam   Triage vitals:  T 36.4 °C (97.5 °F)  HR 74  /80  RR 20  O2 96 % None (Room air)    General: Well appearing and in no acute distress.  HEENT: NCAT. PERRL. Oropharynx pink and moist.  CV: Regular rate, regular rhythm. No murmurs, rubs, or gallops.  Resp: Normal effort. CTAB.  GI: Soft. No tenderness to palpation. No rebound or guarding.  Extremities: No lower extremity edema. 2+ radial pulses bilaterally.  Neuro: Moving all extremities bilaterally.  Psych: Appropriate mood and affect    Medical Decision Making & ED Course   Medical Decision Making:  This is a 73 y.o. male with a past medical history of HTN, HLD, and CAD s/p stent on Plavix who presents with chest pain.  Patient 15 minutes of left-sided chest pain that is nonradiating.  No other major associated symptoms.  Gone now.  Exam is well-appearing.  Vitals are normal.  Benign physical exam.  EKG obtained here was nonischemic.  Labs showing 2 negative troponins.  Dimer was elevated.  CT PE showed no evidence of PE.  Patient has a heart score of 4 but not having good follow-up with cardiology and primary care.  Agreeable to follow-up with them closely.  Placed referrals.  He also had a  stress test done within the past year which was negative.  Advised on symptomatic control at home.  Advised on return precautions and discharge.  ----    Differential diagnoses considered include but are not limited to: acs, pe, pna, ptx, musculoskeltal     Social Determinants of Health which Significantly Impact Care: None identified     EKG Independent Interpretation:  EKG interpreted by me shows normal sinus rhythm with a normal axis, incomplete right bundle branch block, and no acute ischemic changes.    Independent Result Review and Interpretation: Relevant laboratory and radiographic results were reviewed and independently interpreted by myself.  As necessary, they are commented on in the ED Course.    Chronic conditions affecting the patient's care: As documented above in [facility]    The patient was discussed with the following consultants/services: None    Care Considerations: As documented above in [facility]    ED Course:  Diagnoses as of 10/07/24 1622   Chest pain, unspecified type     Disposition   As a result of the work-up, the patient was discharged home.  he was informed of his diagnosis and instructed to come back with any concerns or worsening of condition.  he and was agreeable to the plan as discussed above.  he was given the opportunity to ask questions.  All of the patient's questions were answered.    Maxwell Washington MD  Emergency Medicine     Maxwell Washington MD  10/07/24 5130

## 2024-10-13 LAB
ATRIAL RATE: 59 BPM
P AXIS: 43 DEGREES
PR INTERVAL: 141 MS
Q ONSET: 253 MS
QRS COUNT: 10 BEATS
QRS DURATION: 110 MS
QT INTERVAL: 435 MS
QTC CALCULATION(BAZETT): 431 MS
QTC FREDERICIA: 432 MS
R AXIS: -30 DEGREES
T AXIS: -5 DEGREES
T OFFSET: 470 MS
VENTRICULAR RATE: 59 BPM

## 2024-10-16 ENCOUNTER — APPOINTMENT (OUTPATIENT)
Dept: PRIMARY CARE | Facility: CLINIC | Age: 74
End: 2024-10-16
Payer: COMMERCIAL

## 2024-10-16 ENCOUNTER — CLINICAL SUPPORT (OUTPATIENT)
Dept: AUDIOLOGY | Facility: CLINIC | Age: 74
End: 2024-10-16
Payer: COMMERCIAL

## 2024-10-16 VITALS — DIASTOLIC BLOOD PRESSURE: 70 MMHG | SYSTOLIC BLOOD PRESSURE: 120 MMHG

## 2024-10-16 DIAGNOSIS — I10 ESSENTIAL (PRIMARY) HYPERTENSION: ICD-10-CM

## 2024-10-16 DIAGNOSIS — F32.A DEPRESSION, UNSPECIFIED DEPRESSION TYPE: ICD-10-CM

## 2024-10-16 DIAGNOSIS — I25.10 CORONARY ARTERY DISEASE INVOLVING NATIVE CORONARY ARTERY OF NATIVE HEART WITHOUT ANGINA PECTORIS: ICD-10-CM

## 2024-10-16 DIAGNOSIS — E78.2 MIXED HYPERLIPIDEMIA: ICD-10-CM

## 2024-10-16 DIAGNOSIS — I25.10 CORONARY ARTERY DISEASE INVOLVING NATIVE HEART, UNSPECIFIED VESSEL OR LESION TYPE, UNSPECIFIED WHETHER ANGINA PRESENT: ICD-10-CM

## 2024-10-16 DIAGNOSIS — I10 ESSENTIAL HYPERTENSION: Primary | ICD-10-CM

## 2024-10-16 DIAGNOSIS — H90.3 SNHL (SENSORY-NEURAL HEARING LOSS), ASYMMETRICAL: Primary | ICD-10-CM

## 2024-10-16 PROCEDURE — 1036F TOBACCO NON-USER: CPT | Performed by: STUDENT IN AN ORGANIZED HEALTH CARE EDUCATION/TRAINING PROGRAM

## 2024-10-16 PROCEDURE — 3078F DIAST BP <80 MM HG: CPT | Performed by: STUDENT IN AN ORGANIZED HEALTH CARE EDUCATION/TRAINING PROGRAM

## 2024-10-16 PROCEDURE — 99214 OFFICE O/P EST MOD 30 MIN: CPT | Performed by: STUDENT IN AN ORGANIZED HEALTH CARE EDUCATION/TRAINING PROGRAM

## 2024-10-16 PROCEDURE — 1159F MED LIST DOCD IN RCRD: CPT | Performed by: STUDENT IN AN ORGANIZED HEALTH CARE EDUCATION/TRAINING PROGRAM

## 2024-10-16 PROCEDURE — 3074F SYST BP LT 130 MM HG: CPT | Performed by: STUDENT IN AN ORGANIZED HEALTH CARE EDUCATION/TRAINING PROGRAM

## 2024-10-16 RX ORDER — ESCITALOPRAM OXALATE 20 MG/1
20 TABLET ORAL DAILY
Qty: 90 TABLET | Refills: 1 | Status: SHIPPED | OUTPATIENT
Start: 2024-10-16

## 2024-10-16 RX ORDER — CLOPIDOGREL BISULFATE 75 MG/1
75 TABLET ORAL DAILY
Qty: 90 TABLET | Refills: 1 | Status: SHIPPED | OUTPATIENT
Start: 2024-10-16

## 2024-10-16 RX ORDER — LOSARTAN POTASSIUM 100 MG/1
100 TABLET ORAL DAILY
Qty: 90 TABLET | Refills: 1 | Status: SHIPPED | OUTPATIENT
Start: 2024-10-16

## 2024-10-16 RX ORDER — ATORVASTATIN CALCIUM 80 MG/1
80 TABLET, FILM COATED ORAL DAILY
Qty: 90 TABLET | Refills: 1 | Status: SHIPPED | OUTPATIENT
Start: 2024-10-16

## 2024-10-16 NOTE — PROGRESS NOTES
Subjective   Patient ID: Tuan Castro is a 74 y.o. male who presents for Follow-up (Med refill).    HPI     Here for a follow up visit. Recently seen in the ED for chest pain, which has not returned since. No issues with current medications. No other problems to report. Cardiac work-up in ED was unremarkable.    His mood is stable, traveled recently to [place].    Review of Systems    10 systems reviewed and negative except otherwise noted above in HPI    Objective   /70     Physical Exam    GEN: conversant, NAD  HEENT: PERRL, EOMI, MMM  NECK: supple  CV: S1, S2, RRR  PULM: CTAB  ABD: soft, NT, ND  NEURO: no new gross focal deficits  EXT: no sig LE edema  PSYCH: appropriate affect    Assessment/Plan   ED records, labs imaging reviewed.    #Depression/anxiety: continue escitalopram. Offered referral to psychiatry or psychology, patient declines.     #Paroxysmal SVT, chest pain: recently seen in the ED for chest pain, workup negative, symptoms subsided since. Encouraged follow up with cardiology.     #CAD  s/p FENG, is having no cardiac symptoms, currently on Plavix per cardiology     # HTN:   -On losartan 25 mg daily, BP OK at home.     # HLD:  -CACS 248 in 2020  -Continue atorvastatin  -encouraged diet and exercise     # Misc:  - Tinnitus  - Sensorineural hearing loss  - hx partial colectomy, follows with surgeon   - h/o reported brain bleed ~2010, no current issues     # HM:  - Vaccines: Prevnar 13 given in fall 2016, Pneumovax 23 given in 2017, Shingrix (2019), TDaP (2019), COVID and flu vaccines updated encouraged  - Normal colonoscopy 10/2018, follows with GI     RTC in 4 months    Trainee role: Resident    I saw and evaluated the patient. I personally obtained the key and critical portions of the history and physical exam or was physically present for key and critical portions performed by the trainee. I reviewed the trainee's documentation and discussed the patient with the trainee. I  agree with the trainee's medical decision making as documented on the trainee's notes.    Abram Rodas M.D.

## 2024-10-16 NOTE — PROGRESS NOTES
Hearing Aid Check     [employer] P90-R  M receivers with Cerustops  small vented domes right and left (tried medium for improved retention however insertion was shallow; added retention tab and returned back to small 9/16/2024)   Fit 8/28/2024; real-ear measures obtained 9/16/2024  Recommended changing of domes and wax protectors monthly or as needed.    Paired phone to hearing aids today.    Provided one pack of cerustops today.      Follow up as needed.      [id number]

## 2024-11-11 ENCOUNTER — TELEPHONE (OUTPATIENT)
Dept: PRIMARY CARE | Facility: CLINIC | Age: 74
End: 2024-11-11
Payer: COMMERCIAL

## 2024-11-11 DIAGNOSIS — Z12.11 ENCOUNTER FOR SCREENING FOR MALIGNANT NEOPLASM OF COLON: Primary | ICD-10-CM

## 2024-11-12 ENCOUNTER — TELEPHONE (OUTPATIENT)
Dept: CARDIOLOGY | Facility: CLINIC | Age: 74
End: 2024-11-12
Payer: COMMERCIAL

## 2024-11-12 PROBLEM — E78.00 PURE HYPERCHOLESTEROLEMIA: Status: RESOLVED | Noted: 2023-06-26 | Resolved: 2024-11-12

## 2024-11-12 NOTE — TELEPHONE ENCOUNTER
Rec'd notification that patient will be having a colonoscopy on 12/9/24. Requesting clearance and ok to hold Plavix x5 days.    Last OV 6/14/24  Cardiac catheterization 5/21/2021: Successful FENG of the PDA, mild LAD and RCA disease.     ** of note patient did have an ER visit on 10/7 for chest pain, ACS r/o and patient dc'd home. He does have follow up visit on 12/6 with you.

## 2024-12-06 ENCOUNTER — APPOINTMENT (OUTPATIENT)
Dept: CARDIOLOGY | Facility: CLINIC | Age: 74
End: 2024-12-06
Payer: COMMERCIAL

## 2024-12-06 VITALS
WEIGHT: 207 LBS | BODY MASS INDEX: 29.7 KG/M2 | DIASTOLIC BLOOD PRESSURE: 60 MMHG | OXYGEN SATURATION: 97 % | HEART RATE: 75 BPM | SYSTOLIC BLOOD PRESSURE: 132 MMHG

## 2024-12-06 DIAGNOSIS — I10 ESSENTIAL HYPERTENSION: ICD-10-CM

## 2024-12-06 DIAGNOSIS — Z95.5 PRESENCE OF STENT IN CORONARY ARTERY: ICD-10-CM

## 2024-12-06 DIAGNOSIS — I25.10 CORONARY ARTERY DISEASE INVOLVING NATIVE CORONARY ARTERY OF NATIVE HEART WITHOUT ANGINA PECTORIS: ICD-10-CM

## 2024-12-06 DIAGNOSIS — I47.10 PSVT (PAROXYSMAL SUPRAVENTRICULAR TACHYCARDIA) (CMS-HCC): ICD-10-CM

## 2024-12-06 DIAGNOSIS — R07.89 CHEST DISCOMFORT: Primary | ICD-10-CM

## 2024-12-06 PROCEDURE — 3075F SYST BP GE 130 - 139MM HG: CPT | Performed by: INTERNAL MEDICINE

## 2024-12-06 PROCEDURE — 1036F TOBACCO NON-USER: CPT | Performed by: INTERNAL MEDICINE

## 2024-12-06 PROCEDURE — 99214 OFFICE O/P EST MOD 30 MIN: CPT | Performed by: INTERNAL MEDICINE

## 2024-12-06 PROCEDURE — 1159F MED LIST DOCD IN RCRD: CPT | Performed by: INTERNAL MEDICINE

## 2024-12-06 PROCEDURE — 3078F DIAST BP <80 MM HG: CPT | Performed by: INTERNAL MEDICINE

## 2024-12-06 NOTE — PROGRESS NOTES
[facility] Cardiology Outpatient Follow-up Visit     Reason for Visit: CAD     HPI: Tuan Castro is a 74 y.o.  male who presents today for followup.      Patient is a 74-year-old male with a history of CAD, hypertension, dyslipidemia who initially presented with chest discomfort April 2021. EKG demonstrated normal sinus rhythm with reported dynamic changes in leads V1 through V3. Cardiac enzymes were negative. Cardiac CT demonstrated calcium score 248. Echocardiogram demonstrated ejection fraction 60 to 65%. He underwent cardiac catheterization which demonstrated mild CAD with a 70 to 80% stenosis in the mid to distal left posterior descending artery. He was medically managed. He had 1 or 2 other visits to the emergency department with lightheaded and chest discomfort. He underwent revascularization of the PDA. He admits to an occasional small twinge of chest discomfort lasting 10 to 15 seconds.  These happen randomly.  Can happen with rest or exertion.  He had 1 episode that lasted 15 minutes.  He presented to the emergency department.  He has not had any other exertional symptoms.  He denies any shortness of breath. He denies any syncope, orthopnea, PND. He does admit to varicose veins and some dependent lower extremity edema.      Cardiac catheterization 5/21/2021: Successful FENG of the PDA, mild LAD and RCA disease.   Echocardiogram 2021: Ejection fraction 60 to 65%.   Cardiac CTA 2021: Coronary calcium score 248.   Holter monitor demonstrates sinus rhythm, sinus bradycardia as well as SVT. Low heart rate is 51. High heart rate is 187.   MPI 3/23/2022: Normal perfusion, ejection fraction 54%.   1 week Holter monitor May 2022 with short runs of atrial tachycardia, otherwise sinus bradycardia to sinus tachycardia.  5/4/2023 , LDL 63, HDL 48, triglycerides 183.  6/9/2023 ECG: Normal sinus rhythm, left axis deviation.  7/31/2023 MPI: Normal perfusion, ejection fraction 63%.   3/12/2024 , LDL  82, HDL 51, triglycerides 98.    Past Medical History:   He has a past medical history of HL (hearing loss), Pain in right knee (10/12/2021), and Personal history of other mental and behavioral disorders (07/25/2022).    Surgical History:   He has a past surgical history that includes Hernia repair (01/15/2016); Vasectomy (01/15/2016); Lithotripsy (01/15/2016); Colectomy (01/15/2016); Other surgical history (06/08/2021); Other surgical history (04/29/2021); Other surgical history (06/09/2017); and Tonsillectomy (06/09/2017).    Family History:   No family history on file.    Allergies:  Zolpidem, Cymbalta [duloxetine], Erythromycin, Hydroxyzine, Calamine-zinc oxide, and Trazodone     Social History:   No cigarettes or drugs. Social alcohol.     Prior Cardiovascular Testing (Personally Reviewed):     Review of Systems:  Review of Systems   All other systems reviewed and are negative.      Outpatient Medications:    Current Outpatient Medications:     atorvastatin (Lipitor) 80 mg tablet, Take 1 tablet (80 mg) by mouth once daily., Disp: 90 tablet, Rfl: 1    buPROPion (Wellbutrin) 75 mg tablet, TAKE 1 TABLET BY MOUTH TWICE A DAY, Disp: 180 tablet, Rfl: 0    clopidogrel (Plavix) 75 mg tablet, Take 1 tablet (75 mg) by mouth once daily., Disp: 90 tablet, Rfl: 1    escitalopram (Lexapro) 20 mg tablet, Take 1 tablet (20 mg) by mouth once daily., Disp: 90 tablet, Rfl: 1    gabapentin (Neurontin) 100 mg capsule, Take 1 capsule (100 mg) by mouth 3 times a day., Disp: 270 capsule, Rfl: 1    influenza vac high-dose quad (Fluzone HighDose Quad 20-21 PF) syringe syringe, Inject 0.7 mL into the muscle 1 time., Disp: , Rfl:     losartan (Cozaar) 100 mg tablet, Take 1 tablet (100 mg) by mouth once daily., Disp: 90 tablet, Rfl: 1    melatonin 10 mg capsule, Take 1 capsule (10 mg) by mouth as needed at bedtime for sleep., Disp: , Rfl:     mv,Ca,min-iron-FA-lycopene (Centrum Ultra Men's) 8 mg iron- 200 mcg-600 mcg tablet, Take 1 tablet  by mouth once daily., Disp: , Rfl:     nitroglycerin (Nitrostat) 0.4 mg SL tablet, Place 1 tablet (0.4 mg) under the tongue every 5 minutes if needed for chest pain., Disp: 90 tablet, Rfl: 3    omeprazole OTC (PriLOSEC OTC) 20 mg EC tablet, Take 1 tablet (20 mg) by mouth once daily., Disp: , Rfl:     traZODone (Desyrel) 50 mg tablet, Take 1 tablet (50 mg) by mouth as needed at bedtime for sleep., Disp: 30 tablet, Rfl: 1    vitamins A,C,E-zinc-copper (ICaps AREDS) 4,296 mcg-226 mg-90 mg capsule, Take by mouth., Disp: , Rfl:      Last Recorded Vitals  There were no vitals taken for this visit.    Physical Exam:    Physical Exam  Vitals reviewed.   Constitutional:       Appearance: Normal appearance.   HENT:      Head: Normocephalic and atraumatic.      Mouth/Throat:      Mouth: Mucous membranes are moist.      Pharynx: Oropharynx is clear.   Eyes:      Extraocular Movements: Extraocular movements intact.      Conjunctiva/sclera: Conjunctivae normal.   Cardiovascular:      Rate and Rhythm: Normal rate and regular rhythm.      Pulses: Normal pulses.      Heart sounds: Normal heart sounds.   Pulmonary:      Effort: Pulmonary effort is normal.      Breath sounds: Normal breath sounds.   Abdominal:      General: Bowel sounds are normal.      Palpations: Abdomen is soft.   Musculoskeletal:         General: No swelling.      Cervical back: Neck supple.   Skin:     General: Skin is warm and dry.   Neurological:      General: No focal deficit present.      Mental Status: He is alert.   Psychiatric:         Mood and Affect: Mood normal.         Behavior: Behavior normal.         Lab/Radiology/Diagnostic Review:    Labs    Lab Results   Component Value Date    GLUCOSE 82 10/07/2024    CALCIUM 9.3 10/07/2024     10/07/2024    K 4.1 10/07/2024    CO2 29 10/07/2024     10/07/2024    BUN 13 10/07/2024    CREATININE 1.03 10/07/2024       Lab Results   Component Value Date    WBC 6.8 10/07/2024    HGB 15.6 10/07/2024     "HCT 44.9 10/07/2024    MCV 91 10/07/2024     10/07/2024       Lab Results   Component Value Date    CHOL 153 03/12/2024    CHOL 148 05/04/2023    CHOL 112 03/07/2022     Lab Results   Component Value Date    HDL 51.1 03/12/2024    HDL 48.2 05/04/2023    HDL 46.4 03/07/2022     Lab Results   Component Value Date    LDLCALC 82 03/12/2024     Lab Results   Component Value Date    TRIG 98 03/12/2024    TRIG 183 (H) 05/04/2023    TRIG 46 03/07/2022     No components found for: \"CHOLHDL\"    Lab Results   Component Value Date    BNP 32 07/12/2023    BNP 21 12/10/2021       Lab Results   Component Value Date    TSH 1.35 03/12/2024       Assessment:   The patient is doing reasonably well from a cardiac standpoint.      Patient will continue clopidogrel monotherapy for his CAD. He will continue statin therapy for CAD.     Patient will continue fish oil 1 g twice a day for elevated triglycerides.     Will continue losartan for hypertension.  I asked him to remain active to improve his blood pressure.  If the patient remains hypertensive at his next visit, we will likely have to start an additional medication.     Patient's lower extremity edema has resolved.  He gets occasional dependent lower extremity edema.  He treats this conservatively with leg elevation.     I asked him to keep an eye on his symptoms.      Patient will follow up with us in 6 months or sooner if he has more problems.     Gerson Rosario MD      "

## 2024-12-06 NOTE — LETTER
December 6, 2024     No Recipients    Patient: Tuan Castro   YOB: 1950   Date of Visit: 12/6/2024       Dear Dr. Maurer Recipients:    Thank you for referring Tuan Castro to me for evaluation. Below are my notes for this consultation.  If you have questions, please do not hesitate to call me. I look forward to following your patient along with you.       Sincerely,     Gerson Rosario MD      CC: No Recipients  ______________________________________________________________________________________        [facility] Cardiology Outpatient Follow-up Visit     Reason for Visit: CAD     HPI: Tuan Castro is a 74 y.o.  male who presents today for followup.      Patient is a 74-year-old male with a history of CAD, hypertension, dyslipidemia who initially presented with chest discomfort April 2021. EKG demonstrated normal sinus rhythm with reported dynamic changes in leads V1 through V3. Cardiac enzymes were negative. Cardiac CT demonstrated calcium score 248. Echocardiogram demonstrated ejection fraction 60 to 65%. He underwent cardiac catheterization which demonstrated mild CAD with a 70 to 80% stenosis in the mid to distal left posterior descending artery. He was medically managed. He had 1 or 2 other visits to the emergency department with lightheaded and chest discomfort. He underwent revascularization of the PDA. He admits to an occasional small twinge of chest discomfort lasting 10 to 15 seconds.  These happen randomly.  Can happen with rest or exertion.  He had 1 episode that lasted 15 minutes.  He presented to the emergency department.  He has not had any other exertional symptoms.  He denies any shortness of breath. He denies any syncope, orthopnea, PND. He does admit to varicose veins and some dependent lower extremity edema.      Cardiac catheterization 5/21/2021: Successful FENG of the PDA, mild LAD and RCA disease.   Echocardiogram 2021: Ejection fraction 60 to 65%.    Cardiac CTA 2021: Coronary calcium score 248.   Holter monitor demonstrates sinus rhythm, sinus bradycardia as well as SVT. Low heart rate is 51. High heart rate is 187.   MPI 3/23/2022: Normal perfusion, ejection fraction 54%.   1 week Holter monitor May 2022 with short runs of atrial tachycardia, otherwise sinus bradycardia to sinus tachycardia.  5/4/2023 , LDL 63, HDL 48, triglycerides 183.  6/9/2023 ECG: Normal sinus rhythm, left axis deviation.  7/31/2023 MPI: Normal perfusion, ejection fraction 63%.   3/12/2024 , LDL 82, HDL 51, triglycerides 98.    Past Medical History:   He has a past medical history of HL (hearing loss), Pain in right knee (10/12/2021), and Personal history of other mental and behavioral disorders (07/25/2022).    Surgical History:   He has a past surgical history that includes Hernia repair (01/15/2016); Vasectomy (01/15/2016); Lithotripsy (01/15/2016); Colectomy (01/15/2016); Other surgical history (06/08/2021); Other surgical history (04/29/2021); Other surgical history (06/09/2017); and Tonsillectomy (06/09/2017).    Family History:   No family history on file.    Allergies:  Zolpidem, Cymbalta [duloxetine], Erythromycin, Hydroxyzine, Calamine-zinc oxide, and Trazodone     Social History:   No cigarettes or drugs. Social alcohol.     Prior Cardiovascular Testing (Personally Reviewed):     Review of Systems:  Review of Systems   All other systems reviewed and are negative.      Outpatient Medications:    Current Outpatient Medications:   •  atorvastatin (Lipitor) 80 mg tablet, Take 1 tablet (80 mg) by mouth once daily., Disp: 90 tablet, Rfl: 1  •  buPROPion (Wellbutrin) 75 mg tablet, TAKE 1 TABLET BY MOUTH TWICE A DAY, Disp: 180 tablet, Rfl: 0  •  clopidogrel (Plavix) 75 mg tablet, Take 1 tablet (75 mg) by mouth once daily., Disp: 90 tablet, Rfl: 1  •  escitalopram (Lexapro) 20 mg tablet, Take 1 tablet (20 mg) by mouth once daily., Disp: 90 tablet, Rfl: 1  •  gabapentin  (Neurontin) 100 mg capsule, Take 1 capsule (100 mg) by mouth 3 times a day., Disp: 270 capsule, Rfl: 1  •  influenza vac high-dose quad (Fluzone HighDose Quad 20-21 PF) syringe syringe, Inject 0.7 mL into the muscle 1 time., Disp: , Rfl:   •  losartan (Cozaar) 100 mg tablet, Take 1 tablet (100 mg) by mouth once daily., Disp: 90 tablet, Rfl: 1  •  melatonin 10 mg capsule, Take 1 capsule (10 mg) by mouth as needed at bedtime for sleep., Disp: , Rfl:   •  mv,Ca,min-iron-FA-lycopene (Centrum Ultra Men's) 8 mg iron- 200 mcg-600 mcg tablet, Take 1 tablet by mouth once daily., Disp: , Rfl:   •  nitroglycerin (Nitrostat) 0.4 mg SL tablet, Place 1 tablet (0.4 mg) under the tongue every 5 minutes if needed for chest pain., Disp: 90 tablet, Rfl: 3  •  omeprazole OTC (PriLOSEC OTC) 20 mg EC tablet, Take 1 tablet (20 mg) by mouth once daily., Disp: , Rfl:   •  traZODone (Desyrel) 50 mg tablet, Take 1 tablet (50 mg) by mouth as needed at bedtime for sleep., Disp: 30 tablet, Rfl: 1  •  vitamins A,C,E-zinc-copper (ICaps AREDS) 4,296 mcg-226 mg-90 mg capsule, Take by mouth., Disp: , Rfl:      Last Recorded Vitals  There were no vitals taken for this visit.    Physical Exam:    Physical Exam  Vitals reviewed.   Constitutional:       Appearance: Normal appearance.   HENT:      Head: Normocephalic and atraumatic.      Mouth/Throat:      Mouth: Mucous membranes are moist.      Pharynx: Oropharynx is clear.   Eyes:      Extraocular Movements: Extraocular movements intact.      Conjunctiva/sclera: Conjunctivae normal.   Cardiovascular:      Rate and Rhythm: Normal rate and regular rhythm.      Pulses: Normal pulses.      Heart sounds: Normal heart sounds.   Pulmonary:      Effort: Pulmonary effort is normal.      Breath sounds: Normal breath sounds.   Abdominal:      General: Bowel sounds are normal.      Palpations: Abdomen is soft.   Musculoskeletal:         General: No swelling.      Cervical back: Neck supple.   Skin:     General:  "Skin is warm and dry.   Neurological:      General: No focal deficit present.      Mental Status: He is alert.   Psychiatric:         Mood and Affect: Mood normal.         Behavior: Behavior normal.         Lab/Radiology/Diagnostic Review:    Labs    Lab Results   Component Value Date    GLUCOSE 82 10/07/2024    CALCIUM 9.3 10/07/2024     10/07/2024    K 4.1 10/07/2024    CO2 29 10/07/2024     10/07/2024    BUN 13 10/07/2024    CREATININE 1.03 10/07/2024       Lab Results   Component Value Date    WBC 6.8 10/07/2024    HGB 15.6 10/07/2024    HCT 44.9 10/07/2024    MCV 91 10/07/2024     10/07/2024       Lab Results   Component Value Date    CHOL 153 03/12/2024    CHOL 148 05/04/2023    CHOL 112 03/07/2022     Lab Results   Component Value Date    HDL 51.1 03/12/2024    HDL 48.2 05/04/2023    HDL 46.4 03/07/2022     Lab Results   Component Value Date    LDLCALC 82 03/12/2024     Lab Results   Component Value Date    TRIG 98 03/12/2024    TRIG 183 (H) 05/04/2023    TRIG 46 03/07/2022     No components found for: \"CHOLHDL\"    Lab Results   Component Value Date    BNP 32 07/12/2023    BNP 21 12/10/2021       Lab Results   Component Value Date    TSH 1.35 03/12/2024       Assessment:   The patient is doing reasonably well from a cardiac standpoint.      Patient will continue clopidogrel monotherapy for his CAD. He will continue statin therapy for CAD.     Patient will continue fish oil 1 g twice a day for elevated triglycerides.     Will continue losartan for hypertension.  I asked him to remain active to improve his blood pressure.  If the patient remains hypertensive at his next visit, we will likely have to start an additional medication.     Patient's lower extremity edema has resolved.  He gets occasional dependent lower extremity edema.  He treats this conservatively with leg elevation.     I asked him to keep an eye on his symptoms.      Patient will follow up with us in 6 months or sooner if he " has more problems.     Gerson Rosario MD

## 2024-12-06 NOTE — LETTER
December 6, 2024     Abram Rodas MD  [address]  [place] [address]    Patient: Tuan Castro   YOB: 1950   Date of Visit: 12/6/2024       Dear Dr. Abram Rodas MD:    Thank you for referring Tuan Castro to me for evaluation. Below are my notes for this consultation.  If you have questions, please do not hesitate to call me. I look forward to following your patient along with you.       Sincerely,     Gerson Rosario MD      CC: No Recipients  ______________________________________________________________________________________        [facility] Cardiology Outpatient Follow-up Visit     Reason for Visit: CAD     HPI: Tuan Castro is a 74 y.o.  male who presents today for followup.      Patient is a 74-year-old male with a history of CAD, hypertension, dyslipidemia who initially presented with chest discomfort April 2021. EKG demonstrated normal sinus rhythm with reported dynamic changes in leads V1 through V3. Cardiac enzymes were negative. Cardiac CT demonstrated calcium score 248. Echocardiogram demonstrated ejection fraction 60 to 65%. He underwent cardiac catheterization which demonstrated mild CAD with a 70 to 80% stenosis in the mid to distal left posterior descending artery. He was medically managed. He had 1 or 2 other visits to the emergency department with lightheaded and chest discomfort. He underwent revascularization of the PDA. He admits to an occasional small twinge of chest discomfort lasting 10 to 15 seconds.  These happen randomly.  Can happen with rest or exertion.  He had 1 episode that lasted 15 minutes.  He presented to the emergency department.  He has not had any other exertional symptoms.  He denies any shortness of breath. He denies any syncope, orthopnea, PND. He does admit to varicose veins and some dependent lower extremity edema.      Cardiac catheterization 5/21/2021: Successful  FENG of the PDA, mild LAD and RCA disease.   Echocardiogram 2021: Ejection fraction 60 to 65%.   Cardiac CTA 2021: Coronary calcium score 248.   Holter monitor demonstrates sinus rhythm, sinus bradycardia as well as SVT. Low heart rate is 51. High heart rate is 187.   MPI 3/23/2022: Normal perfusion, ejection fraction 54%.   1 week Holter monitor May 2022 with short runs of atrial tachycardia, otherwise sinus bradycardia to sinus tachycardia.  5/4/2023 , LDL 63, HDL 48, triglycerides 183.  6/9/2023 ECG: Normal sinus rhythm, left axis deviation.  7/31/2023 MPI: Normal perfusion, ejection fraction 63%.   3/12/2024 , LDL 82, HDL 51, triglycerides 98.    Past Medical History:   He has a past medical history of HL (hearing loss), Pain in right knee (10/12/2021), and Personal history of other mental and behavioral disorders (07/25/2022).    Surgical History:   He has a past surgical history that includes Hernia repair (01/15/2016); Vasectomy (01/15/2016); Lithotripsy (01/15/2016); Colectomy (01/15/2016); Other surgical history (06/08/2021); Other surgical history (04/29/2021); Other surgical history (06/09/2017); and Tonsillectomy (06/09/2017).    Family History:   No family history on file.    Allergies:  Zolpidem, Cymbalta [duloxetine], Erythromycin, Hydroxyzine, Calamine-zinc oxide, and Trazodone     Social History:   No cigarettes or drugs. Social alcohol.     Prior Cardiovascular Testing (Personally Reviewed):     Review of Systems:  Review of Systems   All other systems reviewed and are negative.      Outpatient Medications:    Current Outpatient Medications:   •  atorvastatin (Lipitor) 80 mg tablet, Take 1 tablet (80 mg) by mouth once daily., Disp: 90 tablet, Rfl: 1  •  buPROPion (Wellbutrin) 75 mg tablet, TAKE 1 TABLET BY MOUTH TWICE A DAY, Disp: 180 tablet, Rfl: 0  •  clopidogrel (Plavix) 75 mg tablet, Take 1 tablet (75 mg) by mouth once daily., Disp: 90 tablet, Rfl: 1  •  escitalopram (Lexapro) 20 mg  tablet, Take 1 tablet (20 mg) by mouth once daily., Disp: 90 tablet, Rfl: 1  •  gabapentin (Neurontin) 100 mg capsule, Take 1 capsule (100 mg) by mouth 3 times a day., Disp: 270 capsule, Rfl: 1  •  influenza vac high-dose quad (Fluzone HighDose Quad 20-21 PF) syringe syringe, Inject 0.7 mL into the muscle 1 time., Disp: , Rfl:   •  losartan (Cozaar) 100 mg tablet, Take 1 tablet (100 mg) by mouth once daily., Disp: 90 tablet, Rfl: 1  •  melatonin 10 mg capsule, Take 1 capsule (10 mg) by mouth as needed at bedtime for sleep., Disp: , Rfl:   •  mv,Ca,min-iron-FA-lycopene (Centrum Ultra Men's) 8 mg iron- 200 mcg-600 mcg tablet, Take 1 tablet by mouth once daily., Disp: , Rfl:   •  nitroglycerin (Nitrostat) 0.4 mg SL tablet, Place 1 tablet (0.4 mg) under the tongue every 5 minutes if needed for chest pain., Disp: 90 tablet, Rfl: 3  •  omeprazole OTC (PriLOSEC OTC) 20 mg EC tablet, Take 1 tablet (20 mg) by mouth once daily., Disp: , Rfl:   •  traZODone (Desyrel) 50 mg tablet, Take 1 tablet (50 mg) by mouth as needed at bedtime for sleep., Disp: 30 tablet, Rfl: 1  •  vitamins A,C,E-zinc-copper (ICaps AREDS) 4,296 mcg-226 mg-90 mg capsule, Take by mouth., Disp: , Rfl:      Last Recorded Vitals  There were no vitals taken for this visit.    Physical Exam:    Physical Exam  Vitals reviewed.   Constitutional:       Appearance: Normal appearance.   HENT:      Head: Normocephalic and atraumatic.      Mouth/Throat:      Mouth: Mucous membranes are moist.      Pharynx: Oropharynx is clear.   Eyes:      Extraocular Movements: Extraocular movements intact.      Conjunctiva/sclera: Conjunctivae normal.   Cardiovascular:      Rate and Rhythm: Normal rate and regular rhythm.      Pulses: Normal pulses.      Heart sounds: Normal heart sounds.   Pulmonary:      Effort: Pulmonary effort is normal.      Breath sounds: Normal breath sounds.   Abdominal:      General: Bowel sounds are normal.      Palpations: Abdomen is soft.  "  Musculoskeletal:         General: No swelling.      Cervical back: Neck supple.   Skin:     General: Skin is warm and dry.   Neurological:      General: No focal deficit present.      Mental Status: He is alert.   Psychiatric:         Mood and Affect: Mood normal.         Behavior: Behavior normal.         Lab/Radiology/Diagnostic Review:    Labs    Lab Results   Component Value Date    GLUCOSE 82 10/07/2024    CALCIUM 9.3 10/07/2024     10/07/2024    K 4.1 10/07/2024    CO2 29 10/07/2024     10/07/2024    BUN 13 10/07/2024    CREATININE 1.03 10/07/2024       Lab Results   Component Value Date    WBC 6.8 10/07/2024    HGB 15.6 10/07/2024    HCT 44.9 10/07/2024    MCV 91 10/07/2024     10/07/2024       Lab Results   Component Value Date    CHOL 153 03/12/2024    CHOL 148 05/04/2023    CHOL 112 03/07/2022     Lab Results   Component Value Date    HDL 51.1 03/12/2024    HDL 48.2 05/04/2023    HDL 46.4 03/07/2022     Lab Results   Component Value Date    LDLCALC 82 03/12/2024     Lab Results   Component Value Date    TRIG 98 03/12/2024    TRIG 183 (H) 05/04/2023    TRIG 46 03/07/2022     No components found for: \"CHOLHDL\"    Lab Results   Component Value Date    BNP 32 07/12/2023    BNP 21 12/10/2021       Lab Results   Component Value Date    TSH 1.35 03/12/2024       Assessment:   The patient is doing reasonably well from a cardiac standpoint.      Patient will continue clopidogrel monotherapy for his CAD. He will continue statin therapy for CAD.     Patient will continue fish oil 1 g twice a day for elevated triglycerides.     Will continue losartan for hypertension.  I asked him to remain active to improve his blood pressure.  If the patient remains hypertensive at his next visit, we will likely have to start an additional medication.     Patient's lower extremity edema has resolved.  He gets occasional dependent lower extremity edema.  He treats this conservatively with leg elevation.     I " asked him to keep an eye on his symptoms.      Patient will follow up with us in 6 months or sooner if he has more problems.     Gerson Rosario MD

## 2024-12-09 ENCOUNTER — HOSPITAL ENCOUNTER (OUTPATIENT)
Dept: GASTROENTEROLOGY | Facility: HOSPITAL | Age: 74
Discharge: HOME | End: 2024-12-09
Payer: COMMERCIAL

## 2024-12-09 ENCOUNTER — ANESTHESIA EVENT (OUTPATIENT)
Dept: GASTROENTEROLOGY | Facility: HOSPITAL | Age: 74
End: 2024-12-09
Payer: COMMERCIAL

## 2024-12-09 ENCOUNTER — ANESTHESIA (OUTPATIENT)
Dept: GASTROENTEROLOGY | Facility: HOSPITAL | Age: 74
End: 2024-12-09
Payer: COMMERCIAL

## 2024-12-09 VITALS
SYSTOLIC BLOOD PRESSURE: 132 MMHG | WEIGHT: 199.96 LBS | OXYGEN SATURATION: 100 % | HEIGHT: 70 IN | DIASTOLIC BLOOD PRESSURE: 72 MMHG | HEART RATE: 80 BPM | BODY MASS INDEX: 28.63 KG/M2 | TEMPERATURE: 97.2 F | RESPIRATION RATE: 18 BRPM

## 2024-12-09 DIAGNOSIS — Z12.11 ENCOUNTER FOR SCREENING FOR MALIGNANT NEOPLASM OF COLON: ICD-10-CM

## 2024-12-09 DIAGNOSIS — Z12.11 COLON CANCER SCREENING: Primary | ICD-10-CM

## 2024-12-09 PROCEDURE — A45384 PR COLONOSCOPY,REMV LESN,FORCEP/CAUTERY: Performed by: ANESTHESIOLOGIST ASSISTANT

## 2024-12-09 PROCEDURE — A45384 PR COLONOSCOPY,REMV LESN,FORCEP/CAUTERY: Performed by: ANESTHESIOLOGY

## 2024-12-09 PROCEDURE — 3700000001 HC GENERAL ANESTHESIA TIME - INITIAL BASE CHARGE

## 2024-12-09 PROCEDURE — 7100000010 HC PHASE TWO TIME - EACH INCREMENTAL 1 MINUTE

## 2024-12-09 PROCEDURE — 7100000009 HC PHASE TWO TIME - INITIAL BASE CHARGE

## 2024-12-09 PROCEDURE — 2500000004 HC RX 250 GENERAL PHARMACY W/ HCPCS (ALT 636 FOR OP/ED): Performed by: ANESTHESIOLOGIST ASSISTANT

## 2024-12-09 PROCEDURE — 99100 ANES PT EXTEME AGE<1 YR&>70: CPT | Performed by: ANESTHESIOLOGY

## 2024-12-09 PROCEDURE — 3700000002 HC GENERAL ANESTHESIA TIME - EACH INCREMENTAL 1 MINUTE

## 2024-12-09 PROCEDURE — 45378 DIAGNOSTIC COLONOSCOPY: CPT | Performed by: INTERNAL MEDICINE

## 2024-12-09 PROCEDURE — G0121 COLON CA SCRN NOT HI RSK IND: HCPCS | Performed by: INTERNAL MEDICINE

## 2024-12-09 RX ORDER — PROPOFOL 10 MG/ML
INJECTION, EMULSION INTRAVENOUS AS NEEDED
Status: DISCONTINUED | OUTPATIENT
Start: 2024-12-09 | End: 2024-12-09

## 2024-12-09 RX ORDER — LIDOCAINE HYDROCHLORIDE 20 MG/ML
INJECTION, SOLUTION INFILTRATION; PERINEURAL AS NEEDED
Status: DISCONTINUED | OUTPATIENT
Start: 2024-12-09 | End: 2024-12-09

## 2024-12-09 RX ORDER — SODIUM, POTASSIUM,MAG SULFATES 17.5-3.13G
SOLUTION, RECONSTITUTED, ORAL ORAL
Qty: 354 ML | Refills: 0 | Status: SHIPPED | OUTPATIENT
Start: 2024-12-09

## 2024-12-09 SDOH — HEALTH STABILITY: MENTAL HEALTH: CURRENT SMOKER: 0

## 2024-12-09 ASSESSMENT — PAIN SCALES - GENERAL
PAINLEVEL_OUTOF10: 0 - NO PAIN

## 2024-12-09 ASSESSMENT — PAIN - FUNCTIONAL ASSESSMENT
PAIN_FUNCTIONAL_ASSESSMENT: 0-10

## 2024-12-09 ASSESSMENT — COLUMBIA-SUICIDE SEVERITY RATING SCALE - C-SSRS
1. IN THE PAST MONTH, HAVE YOU WISHED YOU WERE DEAD OR WISHED YOU COULD GO TO SLEEP AND NOT WAKE UP?: NO
6. HAVE YOU EVER DONE ANYTHING, STARTED TO DO ANYTHING, OR PREPARED TO DO ANYTHING TO END YOUR LIFE?: NO
2. HAVE YOU ACTUALLY HAD ANY THOUGHTS OF KILLING YOURSELF?: NO

## 2024-12-09 NOTE — H&P (VIEW-ONLY)
Outpatient Hospital Procedure    Patient Profile-Procedures  Initial Info  Patient Demographics  Name Tuan Castro  Date of Birth 1950  MRN [id number]  Address   [address] [place] [id number] [place] [address]    Primary Phone Number [phone]  Secondary Phone Number    PCP Abram Rodas    Procedures   Colonoscopy      Indication:  Surveillance    Primary contact name and number   Extended Emergency Contact Information  Primary Emergency Contact: RADHA VAN  Mobile Phone: [phone]  Relation: Friend   needed? No    General Health  Weight   Vitals:    12/09/24 1130   Weight: 90.7 kg (199 lb 15.3 oz)     BMI Body mass index is 28.69 kg/m².    Allergies  Allergies   Allergen Reactions    Zolpidem Psychosis    Cymbalta [Duloxetine] Palpitations and Respiratory depression    Erythromycin Hives, GI Upset and Nausea/vomiting    Hydroxyzine Anxiety and Unknown     leg spasms    Calamine-Zinc Oxide Hives and Rash    Trazodone GI Upset       Past Medical History   Past Medical History:   Diagnosis Date    HL (hearing loss)     Pain in right knee 10/12/2021    Right knee pain    Personal history of other mental and behavioral disorders 07/25/2022    History of depression       Provider assessment  Diagnosis  Medication Reviewed - yes  Prior to Admission medications    Medication Sig Start Date End Date Taking? Authorizing Provider   atorvastatin (Lipitor) 80 mg tablet Take 1 tablet (80 mg) by mouth once daily. 10/16/24  Yes Abram Rodas MD   buPROPion (Wellbutrin) 75 mg tablet TAKE 1 TABLET BY MOUTH TWICE A DAY 1/2/24  Yes Abram Rodas MD   escitalopram (Lexapro) 20 mg tablet Take 1 tablet (20 mg) by mouth once daily. 10/16/24  Yes Abram Rodas MD   losartan (Cozaar) 100 mg tablet Take 1 tablet (100 mg) by mouth once daily. 10/16/24  Yes Abram Rodas MD   melatonin 10 mg capsule Take 1 capsule (10 mg) by mouth as needed at bedtime for sleep.   Yes Historical  Provider, MD   mv,Ca,min-iron-FA-lycopene (Centrum Ultra Men's) 8 mg iron- 200 mcg-600 mcg tablet Take 1 tablet by mouth once daily. 6/28/21  Yes Historical Provider, MD   omeprazole OTC (PriLOSEC OTC) 20 mg EC tablet Take 1 tablet (20 mg) by mouth once daily.   Yes Historical Provider, MD   traZODone (Desyrel) 50 mg tablet Take 1 tablet (50 mg) by mouth as needed at bedtime for sleep. 1/23/24 1/22/25 Yes Abram Rodas MD   clopidogrel (Plavix) 75 mg tablet Take 1 tablet (75 mg) by mouth once daily. 10/16/24   Abram Rodas MD   gabapentin (Neurontin) 100 mg capsule Take 1 capsule (100 mg) by mouth 3 times a day. 12/5/23 6/2/24  Abram Rodas MD   influenza vac high-dose quad (Fluzone HighDose Quad 20-21 PF) syringe syringe Inject 0.7 mL into the muscle 1 time.    Historical Provider, MD   nitroglycerin (Nitrostat) 0.4 mg SL tablet Place 1 tablet (0.4 mg) under the tongue every 5 minutes if needed for chest pain. 5/7/24 5/7/25  Gerson Rosario MD   vitamins A,C,E-zinc-copper (ICaps AREDS) 4,296 mcg-226 mg-90 mg capsule Take by mouth.  12/6/24  Historical Provider, MD       This is my H&P    Physical Exam  Physical Exam  Constitutional:       Appearance: Normal appearance.   HENT:      Head: Normocephalic and atraumatic.      Mouth/Throat:      Mouth: Mucous membranes are moist.   Eyes:      Extraocular Movements: Extraocular movements intact.      Pupils: Pupils are equal, round, and reactive to light.   Cardiovascular:      Rate and Rhythm: Normal rate and regular rhythm.      Pulses: Normal pulses.      Heart sounds: Normal heart sounds.   Pulmonary:      Effort: Pulmonary effort is normal.      Breath sounds: Normal breath sounds.   Abdominal:      General: Bowel sounds are normal.      Palpations: Abdomen is soft.   Neurological:      Mental Status: He is alert.             ASA PS Classification 3  Sedation Plan Moderate  Procedure Plan - pre-procedural (re)assesment completed by physician:   discharge/transfer patient when discharge criteria met    Rodolfo Pandey MD  12/9/2024 12:27 PM

## 2024-12-09 NOTE — ANESTHESIA POSTPROCEDURE EVALUATION
Patient: Tuan Castro    Procedure Summary       Date: 12/09/24 Room / Location: [facility]    Anesthesia Start: 1232 Anesthesia Stop: 1255    Procedure: COLONOSCOPY Diagnosis: Encounter for screening for malignant neoplasm of colon    Scheduled Providers: Rodolfo Pandey MD; Cecy Johnson MD Responsible Provider: Cecy Johnson MD    Anesthesia Type: MAC ASA Status: 3            Anesthesia Type: MAC    Vitals Value Taken Time   /60 12/09/24 1255   Temp 36.2 12/09/24 1255   Pulse 81 12/09/24 1255   Resp 14 12/09/24 1255   SpO2 96 12/09/24 1255       Anesthesia Post Evaluation    Patient participation: complete - patient participated  Level of consciousness: awake  Pain management: adequate  Airway patency: patent  Cardiovascular status: acceptable  Respiratory status: acceptable  Hydration status: acceptable  Postoperative Nausea and Vomiting: none        No notable events documented.

## 2024-12-09 NOTE — H&P
Outpatient Hospital Procedure    Patient Profile-Procedures  Initial Info  Patient Demographics  Name Tuan Castro  Date of Birth 1950  MRN [id number]  Address   [address] [place] [id number] [address] [place] [address]    Primary Phone Number [phone]  Secondary Phone Number    PCP Abram Rodas    Procedures   Colonoscopy      Indication:  Surveillance    Primary contact name and number   Extended Emergency Contact Information  Primary Emergency Contact: RADHA VAN  Mobile Phone: [phone]  Relation: Friend   needed? No    General Health  Weight   Vitals:    12/09/24 1130   Weight: 90.7 kg (199 lb 15.3 oz)     BMI Body mass index is 28.69 kg/m².    Allergies  Allergies   Allergen Reactions    Zolpidem Psychosis    Cymbalta [Duloxetine] Palpitations and Respiratory depression    Erythromycin Hives, GI Upset and Nausea/vomiting    Hydroxyzine Anxiety and Unknown     leg spasms    Calamine-Zinc Oxide Hives and Rash    Trazodone GI Upset       Past Medical History   Past Medical History:   Diagnosis Date    HL (hearing loss)     Pain in right knee 10/12/2021    Right knee pain    Personal history of other mental and behavioral disorders 07/25/2022    History of depression       Provider assessment  Diagnosis  Medication Reviewed - yes  Prior to Admission medications    Medication Sig Start Date End Date Taking? Authorizing Provider   atorvastatin (Lipitor) 80 mg tablet Take 1 tablet (80 mg) by mouth once daily. 10/16/24  Yes Abram Rodas MD   buPROPion (Wellbutrin) 75 mg tablet TAKE 1 TABLET BY MOUTH TWICE A DAY 1/2/24  Yes Abram Rodas MD   escitalopram (Lexapro) 20 mg tablet Take 1 tablet (20 mg) by mouth once daily. 10/16/24  Yes Abram Rodas MD   losartan (Cozaar) 100 mg tablet Take 1 tablet (100 mg) by mouth once daily. 10/16/24  Yes Abram Rodas MD   melatonin 10 mg capsule Take 1 capsule (10 mg) by mouth as needed at bedtime for sleep.   Yes Historical  Provider, MD   mv,Ca,min-iron-FA-lycopene (Centrum Ultra Men's) 8 mg iron- 200 mcg-600 mcg tablet Take 1 tablet by mouth once daily. 6/28/21  Yes Historical Provider, MD   omeprazole OTC (PriLOSEC OTC) 20 mg EC tablet Take 1 tablet (20 mg) by mouth once daily.   Yes Historical Provider, MD   traZODone (Desyrel) 50 mg tablet Take 1 tablet (50 mg) by mouth as needed at bedtime for sleep. 1/23/24 1/22/25 Yes Abram Rodas MD   clopidogrel (Plavix) 75 mg tablet Take 1 tablet (75 mg) by mouth once daily. 10/16/24   Abram Rodas MD   gabapentin (Neurontin) 100 mg capsule Take 1 capsule (100 mg) by mouth 3 times a day. 12/5/23 6/2/24  Abram Rodas MD   influenza vac high-dose quad (Fluzone HighDose Quad 20-21 PF) syringe syringe Inject 0.7 mL into the muscle 1 time.    Historical Provider, MD   nitroglycerin (Nitrostat) 0.4 mg SL tablet Place 1 tablet (0.4 mg) under the tongue every 5 minutes if needed for chest pain. 5/7/24 5/7/25  Gerson Rosario MD   vitamins A,C,E-zinc-copper (ICaps AREDS) 4,296 mcg-226 mg-90 mg capsule Take by mouth.  12/6/24  Historical Provider, MD       This is my H&P    Physical Exam  Physical Exam  Constitutional:       Appearance: Normal appearance.   HENT:      Head: Normocephalic and atraumatic.      Mouth/Throat:      Mouth: Mucous membranes are moist.   Eyes:      Extraocular Movements: Extraocular movements intact.      Pupils: Pupils are equal, round, and reactive to light.   Cardiovascular:      Rate and Rhythm: Normal rate and regular rhythm.      Pulses: Normal pulses.      Heart sounds: Normal heart sounds.   Pulmonary:      Effort: Pulmonary effort is normal.      Breath sounds: Normal breath sounds.   Abdominal:      General: Bowel sounds are normal.      Palpations: Abdomen is soft.   Neurological:      Mental Status: He is alert.             ASA PS Classification 3  Sedation Plan Moderate  Procedure Plan - pre-procedural (re)assesment completed by physician:   discharge/transfer patient when discharge criteria met    Rodolfo Pandey MD  12/9/2024 12:27 PM

## 2024-12-17 ENCOUNTER — ANESTHESIA (OUTPATIENT)
Dept: GASTROENTEROLOGY | Facility: HOSPITAL | Age: 74
End: 2024-12-17
Payer: COMMERCIAL

## 2024-12-17 ENCOUNTER — HOSPITAL ENCOUNTER (OUTPATIENT)
Dept: GASTROENTEROLOGY | Facility: HOSPITAL | Age: 74
Discharge: HOME | End: 2024-12-17
Payer: COMMERCIAL

## 2024-12-17 ENCOUNTER — ANESTHESIA EVENT (OUTPATIENT)
Dept: GASTROENTEROLOGY | Facility: HOSPITAL | Age: 74
End: 2024-12-17
Payer: COMMERCIAL

## 2024-12-17 VITALS
DIASTOLIC BLOOD PRESSURE: 80 MMHG | HEIGHT: 70 IN | HEART RATE: 53 BPM | SYSTOLIC BLOOD PRESSURE: 136 MMHG | RESPIRATION RATE: 18 BRPM | BODY MASS INDEX: 28.63 KG/M2 | OXYGEN SATURATION: 97 % | TEMPERATURE: 97.3 F | WEIGHT: 199.96 LBS

## 2024-12-17 DIAGNOSIS — Z12.11 SPECIAL SCREENING FOR MALIGNANT NEOPLASMS, COLON: ICD-10-CM

## 2024-12-17 PROCEDURE — A45385 PR COLONOSCOPY,REMV LESN,SNARE: Performed by: NURSE ANESTHETIST, CERTIFIED REGISTERED

## 2024-12-17 PROCEDURE — 99100 ANES PT EXTEME AGE<1 YR&>70: CPT | Performed by: ANESTHESIOLOGY

## 2024-12-17 PROCEDURE — 7100000009 HC PHASE TWO TIME - INITIAL BASE CHARGE

## 2024-12-17 PROCEDURE — 3700000001 HC GENERAL ANESTHESIA TIME - INITIAL BASE CHARGE

## 2024-12-17 PROCEDURE — 7100000010 HC PHASE TWO TIME - EACH INCREMENTAL 1 MINUTE

## 2024-12-17 PROCEDURE — 3700000002 HC GENERAL ANESTHESIA TIME - EACH INCREMENTAL 1 MINUTE

## 2024-12-17 PROCEDURE — 45385 COLONOSCOPY W/LESION REMOVAL: CPT | Performed by: INTERNAL MEDICINE

## 2024-12-17 PROCEDURE — 2500000004 HC RX 250 GENERAL PHARMACY W/ HCPCS (ALT 636 FOR OP/ED): Performed by: NURSE ANESTHETIST, CERTIFIED REGISTERED

## 2024-12-17 PROCEDURE — A45385 PR COLONOSCOPY,REMV LESN,SNARE: Performed by: ANESTHESIOLOGY

## 2024-12-17 RX ORDER — PROPOFOL 10 MG/ML
INJECTION, EMULSION INTRAVENOUS AS NEEDED
Status: DISCONTINUED | OUTPATIENT
Start: 2024-12-17 | End: 2024-12-17

## 2024-12-17 RX ORDER — LIDOCAINE HCL/PF 100 MG/5ML
SYRINGE (ML) INTRAVENOUS AS NEEDED
Status: DISCONTINUED | OUTPATIENT
Start: 2024-12-17 | End: 2024-12-17

## 2024-12-17 SDOH — HEALTH STABILITY: MENTAL HEALTH: CURRENT SMOKER: 0

## 2024-12-17 ASSESSMENT — PAIN SCALES - GENERAL
PAINLEVEL_OUTOF10: 0 - NO PAIN

## 2024-12-17 ASSESSMENT — COLUMBIA-SUICIDE SEVERITY RATING SCALE - C-SSRS
6. HAVE YOU EVER DONE ANYTHING, STARTED TO DO ANYTHING, OR PREPARED TO DO ANYTHING TO END YOUR LIFE?: NO
1. IN THE PAST MONTH, HAVE YOU WISHED YOU WERE DEAD OR WISHED YOU COULD GO TO SLEEP AND NOT WAKE UP?: NO
2. HAVE YOU ACTUALLY HAD ANY THOUGHTS OF KILLING YOURSELF?: NO

## 2024-12-17 NOTE — ANESTHESIA POSTPROCEDURE EVALUATION
Patient: Tuan Castro    Procedure Summary       Date: 12/17/24 Room / Location: [facility]    Anesthesia Start: 1049 Anesthesia Stop:     Procedure: COLONOSCOPY Diagnosis: Special screening for malignant neoplasms, colon    Scheduled Providers: Rodolfo Pandey MD; Dalton Mathis MD Responsible Provider: Dalton Mathis MD    Anesthesia Type: MAC ASA Status: 3            Anesthesia Type: MAC    Vitals Value Taken Time   /61 12/17/24 1117   Temp 36.3 °C (97.3 °F) 12/17/24 1117   Pulse 68 12/17/24 1117   Resp 18 12/17/24 1117   SpO2 92 % 12/17/24 1117       Anesthesia Post Evaluation    Patient location during evaluation: PACU  Patient participation: complete - patient cannot participate  Level of consciousness: sleepy but conscious  Pain management: adequate  Airway patency: patent  Cardiovascular status: acceptable, blood pressure returned to baseline and hemodynamically stable  Respiratory status: acceptable and nasal cannula  Hydration status: acceptable  Postoperative Nausea and Vomiting: none        There were no known notable events for this encounter.

## 2024-12-17 NOTE — ANESTHESIA PREPROCEDURE EVALUATION
Patient: Tuan Castro    Procedure Information       Date/Time: 12/17/24 0930    Scheduled providers: Rodolfo Pandey MD; Dalton Mathis MD    Procedure: COLONOSCOPY    Location: [facility]            Relevant Problems   Anesthesia (within normal limits)      Cardiac   (+) Coronary artery disease   (+) Essential hypertension   (+) Heart murmur   (+) Hyperlipidemia   (+) PFO (patent foramen ovale) (HHS-HCC)   (+) PSVT (paroxysmal supraventricular tachycardia) (CMS-HCC)   (+) Presence of stent in coronary artery   (+) Supraventricular bigeminy      Neuro   (+) Anxiety   (+) Brain bleed (Multi)   (+) Depression   (+) Nightmares      GI   (+) GERD (gastroesophageal reflux disease)      /Renal   (+) Nephrolithiasis      Musculoskeletal   (+) Osteoarthritis of right knee      HEENT   (+) Acute sinusitis   (+) Sensorineural hearing loss, bilateral       Clinical information reviewed:   Tobacco  Allergies  Meds   Med Hx  Surg Hx   Fam Hx          NPO Detail:  NPO/Void Status  Date of Last Liquid: 12/17/24  Time of Last Liquid: 0630  Date of Last Solid: 12/15/24  Time of Last Solid: 1800  Last Intake Type: Clear fluids         Physical Exam    Airway  Mallampati: II  TM distance: >3 FB  Neck ROM: full     Cardiovascular - normal exam  Rhythm: regular  Rate: normal     Dental - normal exam     Pulmonary - normal exam     Abdominal            Anesthesia Plan    History of general anesthesia?: yes  History of complications of general anesthesia?: no    ASA 3     MAC     The patient is not a current smoker.    intravenous induction   Anesthetic plan and risks discussed with patient.  Use of blood products discussed with patient who.    Plan discussed with CRNA.

## 2024-12-26 LAB
LABORATORY COMMENT REPORT: NORMAL
PATH REPORT.FINAL DX SPEC: NORMAL
PATH REPORT.GROSS SPEC: NORMAL
PATH REPORT.RELEVANT HX SPEC: NORMAL
PATH REPORT.TOTAL CANCER: NORMAL

## 2024-12-27 ENCOUNTER — TELEPHONE (OUTPATIENT)
Dept: GASTROENTEROLOGY | Facility: CLINIC | Age: 74
End: 2024-12-27
Payer: COMMERCIAL

## 2024-12-27 NOTE — TELEPHONE ENCOUNTER
Called patient and left VM regarding pathology results showing some polyps as Tubular adenomas and others as benign.     Recommended to have repeat colonoscopy in 3 years.     Rodolfo Pandey MD

## 2025-02-14 ENCOUNTER — CLINICAL SUPPORT (OUTPATIENT)
Dept: AUDIOLOGY | Facility: CLINIC | Age: 75
End: 2025-02-14
Payer: MEDICARE

## 2025-02-14 DIAGNOSIS — H90.3 SNHL (SENSORY-NEURAL HEARING LOSS), ASYMMETRICAL: Primary | ICD-10-CM

## 2025-02-14 PROCEDURE — V5299 HEARING SERVICE: HCPCS | Mod: AUDSP | Performed by: AUDIOLOGIST

## 2025-02-14 NOTE — PROGRESS NOTES
Hearing Aid Check     Elise Williseo P90-R, Fit 8/28/2024  M receivers with Cerustops  small vented domes right and left (tried medium for improved retention however insertion was shallow; added retention tab and returned back to small 9/16/2024)   Recommended changing of domes and wax protectors monthly or as needed.      - rapid fluttering sound from hearing aids occasionally when using the phone;   - has happened without anything going on    Called [facility] service:  Recommendations:  For phone program only:  - reducing low-frequency slightly for phone program  - turned off sound recover   - changed Bluetooth from adaptive to fixed

## 2025-02-20 DIAGNOSIS — I10 ESSENTIAL (PRIMARY) HYPERTENSION: ICD-10-CM

## 2025-02-20 DIAGNOSIS — I25.10 CORONARY ARTERY DISEASE INVOLVING NATIVE HEART, UNSPECIFIED VESSEL OR LESION TYPE, UNSPECIFIED WHETHER ANGINA PRESENT: ICD-10-CM

## 2025-02-20 DIAGNOSIS — F32.A DEPRESSION, UNSPECIFIED DEPRESSION TYPE: ICD-10-CM

## 2025-02-20 RX ORDER — LOSARTAN POTASSIUM 100 MG/1
100 TABLET ORAL DAILY
Qty: 90 TABLET | Refills: 0 | Status: SHIPPED | OUTPATIENT
Start: 2025-02-20

## 2025-02-20 RX ORDER — ATORVASTATIN CALCIUM 80 MG/1
80 TABLET, FILM COATED ORAL DAILY
Qty: 90 TABLET | Refills: 0 | Status: SHIPPED | OUTPATIENT
Start: 2025-02-20

## 2025-02-20 RX ORDER — ESCITALOPRAM OXALATE 20 MG/1
20 TABLET ORAL DAILY
Qty: 90 TABLET | Refills: 0 | Status: SHIPPED | OUTPATIENT
Start: 2025-02-20

## 2025-03-26 ENCOUNTER — OFFICE VISIT (OUTPATIENT)
Dept: PRIMARY CARE | Facility: CLINIC | Age: 75
End: 2025-03-26
Payer: MEDICARE

## 2025-03-26 VITALS — BODY MASS INDEX: 29.41 KG/M2 | SYSTOLIC BLOOD PRESSURE: 132 MMHG | DIASTOLIC BLOOD PRESSURE: 74 MMHG | WEIGHT: 205 LBS

## 2025-03-26 DIAGNOSIS — G25.81 RESTLESS LEG SYNDROME: ICD-10-CM

## 2025-03-26 DIAGNOSIS — I25.10 CORONARY ARTERY DISEASE INVOLVING NATIVE HEART, UNSPECIFIED VESSEL OR LESION TYPE, UNSPECIFIED WHETHER ANGINA PRESENT: ICD-10-CM

## 2025-03-26 DIAGNOSIS — F32.A DEPRESSION, UNSPECIFIED DEPRESSION TYPE: ICD-10-CM

## 2025-03-26 DIAGNOSIS — J01.90 ACUTE SINUSITIS, RECURRENCE NOT SPECIFIED, UNSPECIFIED LOCATION: Primary | ICD-10-CM

## 2025-03-26 DIAGNOSIS — R53.83 OTHER FATIGUE: ICD-10-CM

## 2025-03-26 DIAGNOSIS — I10 ESSENTIAL (PRIMARY) HYPERTENSION: ICD-10-CM

## 2025-03-26 DIAGNOSIS — E55.9 VITAMIN D DEFICIENCY: ICD-10-CM

## 2025-03-26 DIAGNOSIS — I10 ESSENTIAL HYPERTENSION: ICD-10-CM

## 2025-03-26 DIAGNOSIS — R73.09 ABNORMAL GLUCOSE: ICD-10-CM

## 2025-03-26 DIAGNOSIS — F41.9 ANXIETY: ICD-10-CM

## 2025-03-26 DIAGNOSIS — Z12.5 SCREENING FOR PROSTATE CANCER: ICD-10-CM

## 2025-03-26 DIAGNOSIS — E78.2 MIXED HYPERLIPIDEMIA: ICD-10-CM

## 2025-03-26 PROCEDURE — 3078F DIAST BP <80 MM HG: CPT | Performed by: STUDENT IN AN ORGANIZED HEALTH CARE EDUCATION/TRAINING PROGRAM

## 2025-03-26 PROCEDURE — 1159F MED LIST DOCD IN RCRD: CPT | Performed by: STUDENT IN AN ORGANIZED HEALTH CARE EDUCATION/TRAINING PROGRAM

## 2025-03-26 PROCEDURE — G2211 COMPLEX E/M VISIT ADD ON: HCPCS | Performed by: STUDENT IN AN ORGANIZED HEALTH CARE EDUCATION/TRAINING PROGRAM

## 2025-03-26 PROCEDURE — 3075F SYST BP GE 130 - 139MM HG: CPT | Performed by: STUDENT IN AN ORGANIZED HEALTH CARE EDUCATION/TRAINING PROGRAM

## 2025-03-26 PROCEDURE — 99214 OFFICE O/P EST MOD 30 MIN: CPT | Performed by: STUDENT IN AN ORGANIZED HEALTH CARE EDUCATION/TRAINING PROGRAM

## 2025-03-26 RX ORDER — GABAPENTIN 100 MG/1
100 CAPSULE ORAL 3 TIMES DAILY
Qty: 270 CAPSULE | Refills: 1 | Status: SHIPPED | OUTPATIENT
Start: 2025-03-26 | End: 2025-09-22

## 2025-03-26 RX ORDER — AMOXICILLIN AND CLAVULANATE POTASSIUM 875; 125 MG/1; MG/1
875 TABLET, FILM COATED ORAL 2 TIMES DAILY
Qty: 14 TABLET | Refills: 0 | Status: SHIPPED | OUTPATIENT
Start: 2025-03-26 | End: 2025-04-02

## 2025-03-26 NOTE — PROGRESS NOTES
Subjective   Patient ID: Tuan Castro is a 74 y.o. male who presents for thinks have sinus infection and trouble sleeping.    HPI   Routine fu.    He wants to restart gabapentin, which helps with anxiety and insomnia. Previously tolerated this medication well. He stopped Lexapro since our last visit.    He thinks he has a sinus infection. Has maxillary sinus pressure and purulent drainage from his nose for 2 weeks. No fever.    He feels fatigued, wants labs checked.    He is going to [place] in June for 2 weeks for his son's wedding.  Review of Systems  12-point ROS reviewed and was negative unless otherwise noted in HPI.    Objective   /74   Wt 93 kg (205 lb)   BMI 29.41 kg/m²     Physical Exam  GEN: conversant, NAD  HEENT: PERRL, EOMI  NECK: supple  CV: S1, S2, RRR  PULM: CTAB  ABD: ND  NEURO: no new gross focal deficits  EXT: no sig LE edema  PSYCH: appropriate affect    Assessment/Plan     #Acute sinusitis: start course of Augmentin. He will let us know if symptoms persist or worsen.    #Depression/anxiety: he stopped taking escitalopram. Offered referral to psychiatry or psychology, patient declines. Gabapentin helps with insomnia, restart this.     #Paroxysmal SVT, chest pain: recently seen in the ED for chest pain, workup negative, symptoms subsided since. Encouraged follow up with cardiology.      #CAD  s/p FENG, is having no cardiac symptoms, currently on Plavix per cardiology     # HTN:   -On losartan 100 mg daily, BP OK at home.     # HLD:  -CACS 248 in 2020  -Continue atorvastatin  -encouraged diet and exercise     # Misc:  - Tinnitus  - Sensorineural hearing loss  - hx partial colectomy, follows with surgeon   - h/o reported brain bleed ~2010, no current issues     # HM:  - Vaccines: Prevnar 13 given in fall 2016, Pneumovax 23 given in 2017, Shingrix (2019), TDaP (2019), COVID and flu vaccines updated encouraged  - Repeat colonoscopy due 12/2027, follows with GI  - Longitudinal care.      RTC in 2 months for physical, labs before that visit.

## 2025-04-02 ENCOUNTER — TELEPHONE (OUTPATIENT)
Dept: GASTROENTEROLOGY | Facility: CLINIC | Age: 75
End: 2025-04-02
Payer: MEDICARE

## 2025-04-02 NOTE — PROGRESS NOTES
Department of Gastroenterology & Hepatology  Initial Consult Note  Date of Service:  April 2, 2025         Patient: Tuan Castro    Medical Record: [id number]  Reason for Admission / Consultation: black or bloody stool  Gastroenterology Attending: Rodolfo Pandey MD  Referring Provider: Abram Rodas MD No referring provider defined for this encounter.         My final recommendations will be communicated back to the requesting physician by way of shared medical record or letter via US mail         Impression:   74-year-old male with past medical history of coronary disease status post drug-eluting stent on Plavix, hypertension, hyperlipidemia, colon cancer status post laparoscopic sigmoid resection here for diarrhea and black stool    Patient started having off and on diarrhea for the last 3 weeks ago and now is having black stools for the last 1 week  I am concerned about GI bleeding especially him being on Plavix  Will check stat CBC and start him on omeprazole 40 mg twice daily  if there is any drop in hemoglobin from the baseline or worsening of symptoms we will send him to the ER for urgent endoscopic evaluation  Hold Plavix for now      Rodolfo Pandey MD  Gastroenterology, Hepatology and Nutrition  [facility]  =========================================================================  History of Present Illness:     Tuan Castro  is a 74 y.o.   has a past medical history of HL (hearing loss), Pain in right knee (10/12/2021), and Personal history of other mental and behavioral disorders (07/25/2022). who presents for diarrhea for 3 weeks. States having 2-3 watery bowel movement for every few days, in between having one bowel movement per day. Since last week his bowel movements have also turned black. Denies any nausea, vomiting or abdominal pain. Denies any dizziness or SOB.   Denies OTC NSAIDs  Social alcohol use    Om plavix, last dose yesterday evening     Colonoscopy  12/17/2024  Impression  10 subcentimeter polyps in the cecum, ascending colon and transverse colon were removed with cold snare; electronic chromoendoscopy (NBI) was used  Healthy end-to-side colorectal anastomosis in the rectosigmoid  The ileocecal valve, appendiceal orifice, hepatic flexure, splenic flexure, descending colon, sigmoid colon and rectum appeared normal.          Pertinent Review of Systems:    Per HPI rest 12 point ROS negative      Past Medical History:    Past Medical History:   Diagnosis Date    HL (hearing loss)     Pain in right knee 10/12/2021    Right knee pain    Personal history of other mental and behavioral disorders 07/25/2022    History of depression        Past Surgical History:  Past Surgical History:   Procedure Laterality Date    COLECTOMY  01/15/2016    Partial Colectomy    HERNIA REPAIR  01/15/2016    Hernia Repair    LITHOTRIPSY  01/15/2016    Renal Lithotripsy    OTHER SURGICAL HISTORY  06/08/2021    Cardiac catheterization with stent placement    OTHER SURGICAL HISTORY  04/29/2021    Cardiac catheterization    OTHER SURGICAL HISTORY  06/09/2017    Rhinologic Surgery    TONSILLECTOMY  06/09/2017    Tonsillectomy    VASECTOMY  01/15/2016    Surgery Vas Deferens Vasectomy        Family History:  No family history on file.     Social History:  Social History     Tobacco Use    Smoking status: Never    Smokeless tobacco: Never   Substance Use Topics    Alcohol use: Yes     Comment: rarely        Allergies:  Allergies   Allergen Reactions    Zolpidem Psychosis    Cymbalta [Duloxetine] Palpitations and Respiratory depression    Erythromycin Hives, GI Upset and Nausea/vomiting    Hydroxyzine Anxiety and Unknown     leg spasms    Calamine-Zinc Oxide Hives and Rash    Trazodone GI Upset         Medications:  Current Outpatient Medications on File Prior to Visit   Medication Sig Dispense Refill    amoxicillin-pot clavulanate (Augmentin) 875-125 mg tablet Take 1 tablet (875 mg) by mouth 2  times a day for 7 days. 14 tablet 0    atorvastatin (Lipitor) 80 mg tablet TAKE 1 TABLET BY MOUTH ONCE DAILY. 90 tablet 0    buPROPion (Wellbutrin) 75 mg tablet TAKE 1 TABLET BY MOUTH TWICE A  tablet 0    clopidogrel (Plavix) 75 mg tablet Take 1 tablet (75 mg) by mouth once daily. 90 tablet 1    gabapentin (Neurontin) 100 mg capsule Take 1 capsule (100 mg) by mouth 3 times a day. 270 capsule 1    influenza vac high-dose quad (Fluzone HighDose Quad 20-21 PF) syringe syringe Inject 0.7 mL into the muscle 1 time.      losartan (Cozaar) 100 mg tablet TAKE 1 TABLET BY MOUTH EVERY DAY 90 tablet 0    melatonin 10 mg capsule Take 1 capsule (10 mg) by mouth as needed at bedtime for sleep.      mv,Ca,min-iron-FA-lycopene (Centrum Ultra Men's) 8 mg iron- 200 mcg-600 mcg tablet Take 1 tablet by mouth once daily.      nitroglycerin (Nitrostat) 0.4 mg SL tablet Place 1 tablet (0.4 mg) under the tongue every 5 minutes if needed for chest pain. 90 tablet 3    omeprazole OTC (PriLOSEC OTC) 20 mg EC tablet Take 1 tablet (20 mg) by mouth once daily.      sodium,potassium,mag sulfates (Suprep) 17.5-3.13-1.6 gram solution Take one bottle twice as directed by the prep instructions (Patient not taking: Reported on 3/26/2025) 354 mL 0    traZODone (Desyrel) 50 mg tablet Take 1 tablet (50 mg) by mouth as needed at bedtime for sleep. 30 tablet 1     No current facility-administered medications on file prior to visit.            Physical Exam:  There were no vitals taken for this visit.   General appearance: well appearing, alert, in no acute distress  Skin:  no rashes or lesions  Head: normal  Eyes: Anicteric sclera.   ENT: No oral erythema  Lungs: lungs clear to auscultation, no wheezing or rhonchi  Heart: RRR without murmur  Abdomen:  Abdomen soft, non-tender. Bowel sounds normal.   Extremities: Extremities normal.   Musculoskeletal: Muscular strength grossly intact  Neuro: CN2-12 grossly intact     Labs:    Current Outpatient  Medications:     atorvastatin (Lipitor) 80 mg tablet, TAKE 1 TABLET BY MOUTH ONCE DAILY., Disp: 90 tablet, Rfl: 0    buPROPion (Wellbutrin) 75 mg tablet, TAKE 1 TABLET BY MOUTH TWICE A DAY, Disp: 180 tablet, Rfl: 0    gabapentin (Neurontin) 100 mg capsule, Take 1 capsule (100 mg) by mouth 3 times a day., Disp: 270 capsule, Rfl: 1    influenza vac high-dose quad (Fluzone HighDose Quad 20-21 PF) syringe syringe, Inject 0.7 mL into the muscle 1 time., Disp: , Rfl:     losartan (Cozaar) 100 mg tablet, TAKE 1 TABLET BY MOUTH EVERY DAY, Disp: 90 tablet, Rfl: 0    melatonin 10 mg capsule, Take 1 capsule (10 mg) by mouth as needed at bedtime for sleep., Disp: , Rfl:     mv,Ca,min-iron-FA-lycopene (Centrum Ultra Men's) 8 mg iron- 200 mcg-600 mcg tablet, Take 1 tablet by mouth once daily., Disp: , Rfl:     nitroglycerin (Nitrostat) 0.4 mg SL tablet, Place 1 tablet (0.4 mg) under the tongue every 5 minutes if needed for chest pain., Disp: 90 tablet, Rfl: 3    omeprazole OTC (PriLOSEC OTC) 20 mg EC tablet, Take 1 tablet (20 mg) by mouth once daily., Disp: , Rfl:     clopidogrel (Plavix) 75 mg tablet, Take 1 tablet (75 mg) by mouth once daily. (Patient not taking: Reported on 4/3/2025), Disp: 90 tablet, Rfl: 1    omeprazole (PriLOSEC) 40 mg DR capsule, Take 1 capsule (40 mg) by mouth 2 times a day before meals. Do not crush or chew., Disp: 60 capsule, Rfl: 2    sodium,potassium,mag sulfates (Suprep) 17.5-3.13-1.6 gram solution, Take one bottle twice as directed by the prep instructions (Patient not taking: Reported on 4/3/2025), Disp: 354 mL, Rfl: 0    traZODone (Desyrel) 50 mg tablet, Take 1 tablet (50 mg) by mouth as needed at bedtime for sleep., Disp: 30 tablet, Rfl: 1       SIGNATURE: Rodolfo Pandey MD PATIENT NAME: Tuan Castro   DATE: April 2, 2025 MRN: [id number]   TIME: 1:45 PM

## 2025-04-02 NOTE — TELEPHONE ENCOUNTER
Patient called 04/2/2025 wanting to see Dr. Pandey tomorrow.  He has an appt on April 24th, but is stating that when he eats he has an immediate bowel movement w/bile and very loose stool.  Please advise, thank you

## 2025-04-03 ENCOUNTER — LAB REQUISITION (OUTPATIENT)
Dept: LAB | Facility: HOSPITAL | Age: 75
End: 2025-04-03

## 2025-04-03 ENCOUNTER — OFFICE VISIT (OUTPATIENT)
Dept: GASTROENTEROLOGY | Facility: CLINIC | Age: 75
End: 2025-04-03
Payer: MEDICARE

## 2025-04-03 VITALS
HEART RATE: 60 BPM | SYSTOLIC BLOOD PRESSURE: 119 MMHG | DIASTOLIC BLOOD PRESSURE: 78 MMHG | HEIGHT: 70 IN | BODY MASS INDEX: 30.15 KG/M2 | WEIGHT: 210.6 LBS

## 2025-04-03 DIAGNOSIS — K92.1 MELENA: ICD-10-CM

## 2025-04-03 DIAGNOSIS — K92.1 MELENA: Primary | ICD-10-CM

## 2025-04-03 DIAGNOSIS — R19.7 DIARRHEA, UNSPECIFIED TYPE: Primary | ICD-10-CM

## 2025-04-03 DIAGNOSIS — K92.1 BLACK STOOL: ICD-10-CM

## 2025-04-03 LAB
ERYTHROCYTE [DISTWIDTH] IN BLOOD BY AUTOMATED COUNT: 12.5 % (ref 11.5–14.5)
HCT VFR BLD AUTO: 44.8 % (ref 41–52)
HGB BLD-MCNC: 14.5 G/DL (ref 13.5–17.5)
MCH RBC QN AUTO: 29.8 PG (ref 26–34)
MCHC RBC AUTO-ENTMCNC: 32.4 G/DL (ref 32–36)
MCV RBC AUTO: 92 FL (ref 80–100)
NRBC BLD-RTO: 0 /100 WBCS (ref 0–0)
PLATELET # BLD AUTO: 196 X10*3/UL (ref 150–450)
RBC # BLD AUTO: 4.87 X10*6/UL (ref 4.5–5.9)
WBC # BLD AUTO: 5.1 X10*3/UL (ref 4.4–11.3)

## 2025-04-03 PROCEDURE — 3008F BODY MASS INDEX DOCD: CPT | Performed by: INTERNAL MEDICINE

## 2025-04-03 PROCEDURE — 3078F DIAST BP <80 MM HG: CPT | Performed by: INTERNAL MEDICINE

## 2025-04-03 PROCEDURE — 85027 COMPLETE CBC AUTOMATED: CPT

## 2025-04-03 PROCEDURE — 1036F TOBACCO NON-USER: CPT | Performed by: INTERNAL MEDICINE

## 2025-04-03 PROCEDURE — 3074F SYST BP LT 130 MM HG: CPT | Performed by: INTERNAL MEDICINE

## 2025-04-03 PROCEDURE — 36415 COLL VENOUS BLD VENIPUNCTURE: CPT

## 2025-04-03 PROCEDURE — 1159F MED LIST DOCD IN RCRD: CPT | Performed by: INTERNAL MEDICINE

## 2025-04-03 PROCEDURE — 99214 OFFICE O/P EST MOD 30 MIN: CPT | Performed by: INTERNAL MEDICINE

## 2025-04-03 RX ORDER — OMEPRAZOLE 40 MG/1
40 CAPSULE, DELAYED RELEASE ORAL
Qty: 60 CAPSULE | Refills: 2 | Status: SHIPPED | OUTPATIENT
Start: 2025-04-03 | End: 2026-04-03

## 2025-04-03 NOTE — PATIENT INSTRUCTIONS
get blood work done today  Start omeprazole 40 mg twice daily   If worsening bleeding or weakness/dizziness go to ER

## 2025-04-04 LAB
ERYTHROCYTE [DISTWIDTH] IN BLOOD BY AUTOMATED COUNT: 12.2 % (ref 11–15)
HCT VFR BLD AUTO: 44.4 % (ref 38.5–50)
HGB BLD-MCNC: 14.9 G/DL (ref 13.2–17.1)
MCH RBC QN AUTO: 30.5 PG (ref 27–33)
MCHC RBC AUTO-ENTMCNC: 33.6 G/DL (ref 32–36)
MCV RBC AUTO: 91 FL (ref 80–100)
PLATELET # BLD AUTO: 194 THOUSAND/UL (ref 140–400)
PMV BLD REES-ECKER: 9.4 FL (ref 7.5–12.5)
RBC # BLD AUTO: 4.88 MILLION/UL (ref 4.2–5.8)
WBC # BLD AUTO: 5.4 THOUSAND/UL (ref 3.8–10.8)

## 2025-04-24 ENCOUNTER — APPOINTMENT (OUTPATIENT)
Dept: GASTROENTEROLOGY | Facility: CLINIC | Age: 75
End: 2025-04-24
Payer: MEDICARE

## 2025-05-04 DIAGNOSIS — I25.10 CORONARY ARTERY DISEASE INVOLVING NATIVE HEART, UNSPECIFIED VESSEL OR LESION TYPE, UNSPECIFIED WHETHER ANGINA PRESENT: ICD-10-CM

## 2025-05-05 RX ORDER — CLOPIDOGREL BISULFATE 75 MG/1
75 TABLET ORAL DAILY
Qty: 90 TABLET | Refills: 0 | Status: SHIPPED | OUTPATIENT
Start: 2025-05-05

## 2025-05-10 LAB
25(OH)D3+25(OH)D2 SERPL-MCNC: 56 NG/ML (ref 30–100)
ALBUMIN SERPL-MCNC: 4.4 G/DL (ref 3.6–5.1)
ALP SERPL-CCNC: 79 U/L (ref 35–144)
ALT SERPL-CCNC: 31 U/L (ref 9–46)
ANION GAP SERPL CALCULATED.4IONS-SCNC: 9 MMOL/L (CALC) (ref 7–17)
AST SERPL-CCNC: 21 U/L (ref 10–35)
BILIRUB SERPL-MCNC: 0.7 MG/DL (ref 0.2–1.2)
BUN SERPL-MCNC: 18 MG/DL (ref 7–25)
CALCIUM SERPL-MCNC: 9.3 MG/DL (ref 8.6–10.3)
CHLORIDE SERPL-SCNC: 106 MMOL/L (ref 98–110)
CHOLEST SERPL-MCNC: 143 MG/DL
CHOLEST/HDLC SERPL: 2.9 (CALC)
CO2 SERPL-SCNC: 28 MMOL/L (ref 20–32)
CREAT SERPL-MCNC: 1.02 MG/DL (ref 0.7–1.28)
EGFRCR SERPLBLD CKD-EPI 2021: 77 ML/MIN/1.73M2
ERYTHROCYTE [DISTWIDTH] IN BLOOD BY AUTOMATED COUNT: 12.5 % (ref 11–15)
EST. AVERAGE GLUCOSE BLD GHB EST-MCNC: 111 MG/DL
EST. AVERAGE GLUCOSE BLD GHB EST-SCNC: 6.2 MMOL/L
GLUCOSE SERPL-MCNC: 94 MG/DL (ref 65–99)
HBA1C MFR BLD: 5.5 %
HCT VFR BLD AUTO: 44.9 % (ref 38.5–50)
HDLC SERPL-MCNC: 49 MG/DL
HGB BLD-MCNC: 14.8 G/DL (ref 13.2–17.1)
LDLC SERPL CALC-MCNC: 77 MG/DL (CALC)
MCH RBC QN AUTO: 29.8 PG (ref 27–33)
MCHC RBC AUTO-ENTMCNC: 33 G/DL (ref 32–36)
MCV RBC AUTO: 90.5 FL (ref 80–100)
NONHDLC SERPL-MCNC: 94 MG/DL (CALC)
PLATELET # BLD AUTO: 205 THOUSAND/UL (ref 140–400)
PMV BLD REES-ECKER: 9.4 FL (ref 7.5–12.5)
POTASSIUM SERPL-SCNC: 4.1 MMOL/L (ref 3.5–5.3)
PROT SERPL-MCNC: 7 G/DL (ref 6.1–8.1)
PSA SERPL-MCNC: 3.23 NG/ML
RBC # BLD AUTO: 4.96 MILLION/UL (ref 4.2–5.8)
SODIUM SERPL-SCNC: 143 MMOL/L (ref 135–146)
TESTOST FREE SERPL-MCNC: 48.4 PG/ML (ref 30–135)
TESTOST SERPL-MCNC: 417 NG/DL (ref 250–1100)
TRIGL SERPL-MCNC: 89 MG/DL
TSH SERPL-ACNC: 1.51 MIU/L (ref 0.4–4.5)
VIT B12 SERPL-MCNC: 1405 PG/ML (ref 200–1100)
WBC # BLD AUTO: 6.1 THOUSAND/UL (ref 3.8–10.8)

## 2025-05-12 ENCOUNTER — APPOINTMENT (OUTPATIENT)
Dept: PRIMARY CARE | Facility: CLINIC | Age: 75
End: 2025-05-12
Payer: COMMERCIAL

## 2025-05-12 VITALS — DIASTOLIC BLOOD PRESSURE: 72 MMHG | SYSTOLIC BLOOD PRESSURE: 124 MMHG

## 2025-05-12 DIAGNOSIS — R53.83 OTHER FATIGUE: ICD-10-CM

## 2025-05-12 DIAGNOSIS — F32.A DEPRESSION, UNSPECIFIED DEPRESSION TYPE: ICD-10-CM

## 2025-05-12 DIAGNOSIS — E78.2 MIXED HYPERLIPIDEMIA: ICD-10-CM

## 2025-05-12 DIAGNOSIS — R29.818 SUSPECTED SLEEP APNEA: ICD-10-CM

## 2025-05-12 DIAGNOSIS — I25.10 CORONARY ARTERY DISEASE INVOLVING NATIVE CORONARY ARTERY OF NATIVE HEART WITHOUT ANGINA PECTORIS: ICD-10-CM

## 2025-05-12 DIAGNOSIS — I10 ESSENTIAL (PRIMARY) HYPERTENSION: ICD-10-CM

## 2025-05-12 DIAGNOSIS — F41.9 ANXIETY: Primary | ICD-10-CM

## 2025-05-12 PROCEDURE — G2211 COMPLEX E/M VISIT ADD ON: HCPCS | Performed by: STUDENT IN AN ORGANIZED HEALTH CARE EDUCATION/TRAINING PROGRAM

## 2025-05-12 PROCEDURE — 99214 OFFICE O/P EST MOD 30 MIN: CPT | Performed by: STUDENT IN AN ORGANIZED HEALTH CARE EDUCATION/TRAINING PROGRAM

## 2025-05-12 PROCEDURE — 3078F DIAST BP <80 MM HG: CPT | Performed by: STUDENT IN AN ORGANIZED HEALTH CARE EDUCATION/TRAINING PROGRAM

## 2025-05-12 PROCEDURE — 3074F SYST BP LT 130 MM HG: CPT | Performed by: STUDENT IN AN ORGANIZED HEALTH CARE EDUCATION/TRAINING PROGRAM

## 2025-05-12 NOTE — PROGRESS NOTES
Subjective   Patient ID: Tuan Castro is a 74 y.o. male who presents for Follow-up.    HPI   Routine fu.    He is mowing his lawn, riding bike, walking dog for exercise.    He feels fatigued regularly. Sleep is poor. He often falls asleep about 1 hour after waking up.    He stopped taking Wellbutrin, mood is stable.  Review of Systems  12-point ROS reviewed and was negative unless otherwise noted in HPI.    Objective   /72     Physical Exam  GEN: conversant, NAD  HEENT: PERRL, EOMI, MMM  NECK: supple, no carotid bruits appreciated b/l  CV: S1, S2, RRR  PULM: CTAB  ABD: soft, NT, ND  NEURO: no new gross focal deficits  EXT: no sig LE edema  PSYCH: appropriate affect    Assessment/Plan     #Depression/anxiety: he stopped taking escitalopram and Wellbutrin. Offered referral to psychiatry or psychology, patient declines. Gabapentin helps with insomnia.     #Paroxysmal SVT, chest pain: Encouraged follow up with cardiology.      #CAD  s/p FENG, is having no cardiac symptoms, currently on Plavix per cardiology     # HTN:   -On losartan 100 mg daily, BP stable     # HLD:  -CACS 248 in 2020  -Continue atorvastatin  -encouraged diet and exercise    #Fatigue/poor sleep: referral to sleep medicine     # Misc:  - Tinnitus  - Sensorineural hearing loss  - hx partial colectomy, follows with surgeon   - h/o reported brain bleed ~2010, no current issues     # HM:  - Vaccines: Prevnar 13 given in fall 2016, Pneumovax 23 given in 2017, Shingrix (2019), TDaP (2019), COVID and flu vaccines updated encouraged  - Repeat colonoscopy due 12/2027, follows with GI  - Longitudinal care.  - Labs reviewed     RTC 6 mo

## 2025-06-17 ENCOUNTER — CLINICAL SUPPORT (OUTPATIENT)
Dept: AUDIOLOGY | Facility: CLINIC | Age: 75
End: 2025-06-17
Payer: MEDICARE

## 2025-06-17 DIAGNOSIS — H90.3 SNHL (SENSORY-NEURAL HEARING LOSS), ASYMMETRICAL: Primary | ICD-10-CM

## 2025-06-17 PROCEDURE — V5299 HEARING SERVICE: HCPCS | Mod: AUDSP | Performed by: AUDIOLOGIST

## 2025-06-17 NOTE — PROGRESS NOTES
Hearing Aid Check    Phonak Audeo P90-R, Fit 8/28/2024  1M receivers with Cerustops  small vented domes right and left (tried medium for improved retention however insertion was shallow; added retention tab and returned back to small 9/16/2024)   Recommended changing of domes and wax protectors monthly or as needed.      Left aid is damaged (case crushed) [place].  Provided a pair of loaner aids (Audeo M90-R (Trials), RSN [id number]; LSN [id number]) which patient will return when his left HA is back from repair.      Patient paid HAC fee today.      Rec.:  repaired aid in about 2 weeks (patient will be contacted when repair is back).

## 2025-06-18 ENCOUNTER — APPOINTMENT (OUTPATIENT)
Dept: CARDIOLOGY | Facility: CLINIC | Age: 75
End: 2025-06-18
Payer: MEDICARE

## 2025-06-30 PROBLEM — H61.20 IMPACTED CERUMEN: Status: RESOLVED | Noted: 2024-05-21 | Resolved: 2025-06-30

## 2025-06-30 PROBLEM — J34.89 NASAL DRAINAGE: Status: RESOLVED | Noted: 2023-06-26 | Resolved: 2025-06-30

## 2025-06-30 PROBLEM — R07.0 PAIN IN THROAT: Status: RESOLVED | Noted: 2018-09-13 | Resolved: 2025-06-30

## 2025-06-30 PROBLEM — E66.811 CLASS 1 OBESITY WITH BODY MASS INDEX (BMI) OF 30.0 TO 30.9 IN ADULT: Status: ACTIVE | Noted: 2024-05-21

## 2025-06-30 PROBLEM — R09.81 NASAL CONGESTION: Status: RESOLVED | Noted: 2023-06-26 | Resolved: 2025-06-30

## 2025-06-30 PROBLEM — J01.90 ACUTE SINUSITIS: Status: RESOLVED | Noted: 2023-06-26 | Resolved: 2025-06-30

## 2025-06-30 PROBLEM — H61.23 BILATERAL IMPACTED CERUMEN: Status: RESOLVED | Noted: 2023-06-26 | Resolved: 2025-06-30

## 2025-07-09 ENCOUNTER — OFFICE VISIT (OUTPATIENT)
Dept: GASTROENTEROLOGY | Facility: CLINIC | Age: 75
End: 2025-07-09
Payer: MEDICARE

## 2025-07-09 VITALS
BODY MASS INDEX: 29.41 KG/M2 | SYSTOLIC BLOOD PRESSURE: 119 MMHG | WEIGHT: 205.4 LBS | HEART RATE: 74 BPM | DIASTOLIC BLOOD PRESSURE: 76 MMHG | HEIGHT: 70 IN

## 2025-07-09 DIAGNOSIS — A09 TRAVELER'S DIARRHEA: ICD-10-CM

## 2025-07-09 DIAGNOSIS — R19.7 DIARRHEA, UNSPECIFIED TYPE: Primary | ICD-10-CM

## 2025-07-09 PROCEDURE — 99214 OFFICE O/P EST MOD 30 MIN: CPT | Performed by: NURSE PRACTITIONER

## 2025-07-09 PROCEDURE — 1036F TOBACCO NON-USER: CPT | Performed by: NURSE PRACTITIONER

## 2025-07-09 PROCEDURE — 3078F DIAST BP <80 MM HG: CPT | Performed by: NURSE PRACTITIONER

## 2025-07-09 PROCEDURE — 3008F BODY MASS INDEX DOCD: CPT | Performed by: NURSE PRACTITIONER

## 2025-07-09 PROCEDURE — 1159F MED LIST DOCD IN RCRD: CPT | Performed by: NURSE PRACTITIONER

## 2025-07-09 PROCEDURE — 3074F SYST BP LT 130 MM HG: CPT | Performed by: NURSE PRACTITIONER

## 2025-07-09 NOTE — PROGRESS NOTES
CC: Diarrhea- Patient still having diarrhea after coming from his trip     History of Present Illness:   Tuan Castro is a 74 y.o. male with a PMH of coronary disease status post drug-eluting stent on Plavix, hypertension, hyperlipidemia, (hearing loss) mental and behavioral disorders   H/o colon cancer status post laparoscopic sigmoid resection   History of chronic diarrhea,  last seen by Dr. Pandey 4/3/2025       Today here for diarrhea since  2 weeks -3 times day , most of the time after eating, watery watery stools.    He reports he was traveling to [place], he he was drinking water from local [place], developed diarrhea while in [place], he was taking Imodium as needed until he got back home on Friday.  He continues to have watery stool especially after eating.   Denies abdominal pain, nausea, vomiting, heartburn, acid reflux, Dysphagia.   Denies constipation,  fever.  Denies hematochezia, melena, nocturnal bowel movements.  Denies weight loss , somehow appetite change.    On plavix, Denies use of NSAIDs, aspirin  Denies any recent change in medication or use of new medication.    Denies family history of colon cancer, celiac disease, inflammatory bowel disease, liver disease.  Social  alcohol use, denies smoking, denies illicit drug use.    H/o Abdominal surgery  -  COLECTOMY   01/15/2016      Partial Colectomy    HERNIA REPAIR   01/15/2016     Hernia Repair     Review of Systems  ROS Negative unless otherwise stated above.      Past Medical/Surgical History  Medical History[1]   Surgical History[2]     Social History   reports that he has never smoked. He has never used smokeless tobacco. He reports current alcohol use. He reports that he does not use drugs.     Family History  family history is not on file.     Allergies  RX Allergies[3]    Medications  Current Outpatient Medications   Medication Instructions    atorvastatin (LIPITOR) 80 mg, oral, Daily    clopidogrel (PLAVIX) 75 mg, oral, Daily     gabapentin (NEURONTIN) 100 mg, oral, 3 times daily    influenza vac high-dose quad (Fluzone HighDose Quad 20-21 PF) syringe syringe 0.7 mL, Once    losartan (COZAAR) 100 mg, oral, Daily    melatonin 10 mg, Nightly PRN    mv,Ca,min-iron-FA-lycopene (Centrum Ultra Men's) 8 mg iron- 200 mcg-600 mcg tablet 1 tablet, Daily    nitroglycerin (NITROSTAT) 0.4 mg, sublingual, Every 5 min PRN    omeprazole OTC (PRILOSEC OTC) 20 mg, Daily        Objective   Visit Vitals  /76 (BP Location: Left arm, Patient Position: Sitting)   Pulse 74      Physical Exam   General: A&Ox3, NAD.  HEENT: No scleral icterus, no conjunctival pallor, normocephalic, atraumatic  Neck:  atraumatic, trachea midline, no JVD   CV: RRR. Positive S1/S2.   Resp: CTA bilaterally. No wheezing, rhonchi or rales.   GI: BSx4. no distension, Soft, non-tender to palpation, no rebound tenderness, no guarding, no rigidity, no discernible ascites   Extremities: no lower extremity edema.  Neuro: No focal deficits. no asterixis  Psych: pleasant and cooperative.  Skin:  Warm and dry, no jaundice      Lab Results   Component Value Date    WBC 6.1 05/06/2025    HGB 14.8 05/06/2025    HCT 44.9 05/06/2025    MCV 90.5 05/06/2025     05/06/2025       Chemistry    Lab Results   Component Value Date/Time     05/06/2025 1040    K 4.1 05/06/2025 1040     05/06/2025 1040    CO2 28 05/06/2025 1040    BUN 18 05/06/2025 1040    CREATININE 1.02 05/06/2025 1040    Lab Results   Component Value Date/Time    CALCIUM 9.3 05/06/2025 1040    ALKPHOS 79 05/06/2025 1040    AST 21 05/06/2025 1040    ALT 31 05/06/2025 1040    BILITOT 0.7 05/06/2025 1040         Colonoscopy 12/17/2024  Impression  10 subcentimeter polyps in the cecum, ascending colon and transverse colon were removed with cold snare; electronic chromoendoscopy (NBI) was used  Healthy end-to-side colorectal anastomosis in the rectosigmoid  The ileocecal valve, appendiceal orifice, hepatic flexure,  splenic flexure, descending colon, sigmoid colon and rectum appeared normal.    A. CECUM, POLYPS X 7, POLYPECTOMY:  REACTIVE COLONIC MUCOSA WITH PROLAPSE-TYPE FEATURES AND BENIGN LYMPHOID AGGREGATES.  COLONIC MUCOSA WITH NO SIGNIFICANT HISTOPATHOLOGIC ABNORMALITY.     B. COLON, ASCENDING, POLYP, POLYPECTOMY:  TUBULAR ADENOMA.     C. COLON, TRANSVERSE, POLYPS X 2, POLYPECTOMY:  TUBULAR ADENOMA(S), FRAGMENTED.  COLONIC MUCOSA WITH NO SIGNIFICANT HISTOPATHOLOGIC ABNORMALITY.  Repeat colonoscopy in 1 year, due: 12/17/2025     CT ABDOMEN PELVIS W IV CONTRAST; 4/1/2024   IMPRESSION:  1. Nonspecific fluid within distal small bowel and proximal colonic  loops, correlate for enterocolitis.  2. Cholelithiasis no CT evidence of acute cholecystitis.  3. Nonobstructing right renal calculi.  4. Mild hepatic steatosis.  Assessment & Plan  Diarrhea, unspecified type  Acute diarrhea, history of recent travel to [place], exposure to local food and water.  Orders:    C. difficile, PCR    Ova/Para + Giardia/Cryptosporidium Antigen; Future    Stool Pathogen Panel, PCR    Traveler's diarrhea  Acute diarrhea, history of recent travel to [place], exposure to local food and water.  Orders:    C. difficile, PCR    Ova/Para + Giardia/Cryptosporidium Antigen; Future    Stool Pathogen Panel, PCR     GI friendly diet  Avoid Imodium  Complete stool studies    Prevent dehydration  Watch for blood in the stool, weakness, dizziness, severe nausea and vomiting.    Will follow-up with those test results          Nancy Rodriguez, GRACE-CNP    The note was created using voice recognition transcription software. Despite proofreading, unintentional typographical errors may be present. Please contact the GI office with any questions or concerns.          [1]   Past Medical History:  Diagnosis Date    HL (hearing loss)     Pain in right knee 10/12/2021    Right knee pain    Personal history of other mental and behavioral disorders 07/25/2022    History of  depression   [2]   Past Surgical History:  Procedure Laterality Date    COLECTOMY  01/15/2016    Partial Colectomy    HERNIA REPAIR  01/15/2016    Hernia Repair    LITHOTRIPSY  01/15/2016    Renal Lithotripsy    OTHER SURGICAL HISTORY  06/08/2021    Cardiac catheterization with stent placement    OTHER SURGICAL HISTORY  04/29/2021    Cardiac catheterization    OTHER SURGICAL HISTORY  06/09/2017    Rhinologic Surgery    TONSILLECTOMY  06/09/2017    Tonsillectomy    VASECTOMY  01/15/2016    Surgery Vas Deferens Vasectomy   [3]   Allergies  Allergen Reactions    Zolpidem Psychosis    Cymbalta [Duloxetine] Palpitations and Respiratory depression    Erythromycin Hives, GI Upset and Nausea/vomiting    Hydroxyzine Anxiety and Unknown     leg spasms    Calamine-Zinc Oxide Hives and Rash    Trazodone GI Upset

## 2025-07-09 NOTE — PATIENT INSTRUCTIONS
GI friendly diet  Avoid Imodium  Complete stool studies    Prevent dehydration  Watch for blood in the stool, weakness, dizziness, severe nausea and vomiting.    Will follow-up with those test results

## 2025-07-11 DIAGNOSIS — R19.7 DIARRHEA, UNSPECIFIED TYPE: Primary | ICD-10-CM

## 2025-07-11 LAB
C COLI+JEJUNI+LARI FUSA STL QL NAA+PROBE: NOT DETECTED
C DIFF TOX GENS STL QL NAA+PROBE: NOT DETECTED
CRYPTOSP AG STL QL IA: NORMAL
EC STX1 GENE STL QL NAA+PROBE: NOT DETECTED
EC STX2 GENE STL QL NAA+PROBE: NOT DETECTED
G LAMBLIA AG STL QL IA: NORMAL
NOROV GI+II ORF1-ORF2 JNC STL QL NAA+PR: NOT DETECTED
O+P STL CONC: NORMAL
O+P STL TRI STN: NORMAL
RVA NSP5 STL QL NAA+PROBE: NOT DETECTED
SALMONELLA SP RPOD STL QL NAA+PROBE: DETECTED
SHIGELLA DNA SPEC QL NAA+PROBE: NOT DETECTED
V CHOL+PARA RFBL+TRKH+TNAA STL QL NAA+PR: NOT DETECTED
Y ENTERO RECN STL QL NAA+PROBE: NOT DETECTED

## 2025-07-12 PROBLEM — E66.811 CLASS 1 OBESITY WITH BODY MASS INDEX (BMI) OF 30.0 TO 30.9 IN ADULT: Status: RESOLVED | Noted: 2024-05-21 | Resolved: 2025-07-12

## 2025-07-14 ENCOUNTER — TELEPHONE (OUTPATIENT)
Dept: GASTROENTEROLOGY | Facility: CLINIC | Age: 75
End: 2025-07-14

## 2025-07-14 ENCOUNTER — OFFICE VISIT (OUTPATIENT)
Dept: CARDIOLOGY | Facility: CLINIC | Age: 75
End: 2025-07-14
Payer: MEDICARE

## 2025-07-14 VITALS
SYSTOLIC BLOOD PRESSURE: 126 MMHG | HEART RATE: 84 BPM | HEIGHT: 70 IN | DIASTOLIC BLOOD PRESSURE: 82 MMHG | WEIGHT: 200.2 LBS | OXYGEN SATURATION: 96 % | BODY MASS INDEX: 28.66 KG/M2

## 2025-07-14 DIAGNOSIS — I10 ESSENTIAL HYPERTENSION: ICD-10-CM

## 2025-07-14 DIAGNOSIS — I25.10 CORONARY ARTERY DISEASE INVOLVING NATIVE CORONARY ARTERY OF NATIVE HEART WITHOUT ANGINA PECTORIS: ICD-10-CM

## 2025-07-14 DIAGNOSIS — I47.10 PSVT (PAROXYSMAL SUPRAVENTRICULAR TACHYCARDIA): Primary | ICD-10-CM

## 2025-07-14 DIAGNOSIS — Z95.5 PRESENCE OF STENT IN CORONARY ARTERY: ICD-10-CM

## 2025-07-14 DIAGNOSIS — Q21.12 PFO (PATENT FORAMEN OVALE) (HHS-HCC): ICD-10-CM

## 2025-07-14 DIAGNOSIS — E78.5 HYPERLIPIDEMIA, UNSPECIFIED HYPERLIPIDEMIA TYPE: ICD-10-CM

## 2025-07-14 PROCEDURE — 3079F DIAST BP 80-89 MM HG: CPT | Performed by: STUDENT IN AN ORGANIZED HEALTH CARE EDUCATION/TRAINING PROGRAM

## 2025-07-14 PROCEDURE — 3074F SYST BP LT 130 MM HG: CPT | Performed by: STUDENT IN AN ORGANIZED HEALTH CARE EDUCATION/TRAINING PROGRAM

## 2025-07-14 PROCEDURE — 3008F BODY MASS INDEX DOCD: CPT | Performed by: STUDENT IN AN ORGANIZED HEALTH CARE EDUCATION/TRAINING PROGRAM

## 2025-07-14 PROCEDURE — 99213 OFFICE O/P EST LOW 20 MIN: CPT | Performed by: STUDENT IN AN ORGANIZED HEALTH CARE EDUCATION/TRAINING PROGRAM

## 2025-07-14 PROCEDURE — 99212 OFFICE O/P EST SF 10 MIN: CPT | Performed by: STUDENT IN AN ORGANIZED HEALTH CARE EDUCATION/TRAINING PROGRAM

## 2025-07-14 PROCEDURE — 1036F TOBACCO NON-USER: CPT | Performed by: STUDENT IN AN ORGANIZED HEALTH CARE EDUCATION/TRAINING PROGRAM

## 2025-07-14 PROCEDURE — 1159F MED LIST DOCD IN RCRD: CPT | Performed by: STUDENT IN AN ORGANIZED HEALTH CARE EDUCATION/TRAINING PROGRAM

## 2025-07-14 RX ORDER — CIPROFLOXACIN 500 MG/1
500 TABLET, FILM COATED ORAL 2 TIMES DAILY
Qty: 14 TABLET | Refills: 0 | Status: SHIPPED | OUTPATIENT
Start: 2025-07-14 | End: 2025-07-21

## 2025-07-14 RX ORDER — ESCITALOPRAM OXALATE 20 MG/1
1 TABLET ORAL
COMMUNITY
Start: 2025-05-01

## 2025-07-14 NOTE — PROGRESS NOTES
Referred by Dr. Maurer ref. provider found for stent, PSVT, badycardia, Coronary Artery Disease, Hypertension, Heart Murmur, Hyperlipidemia, and PFO (Former [facility] patient/6 month follow up)     History Of Present Illness:    Tuan Castro is a 74 y.o. male Presents to our clinic to establish care.  He has past medical history remarkable for CAD status post stent to the PDA mild disease in LAD territory, hypertension, hyperlipidemia.  He has follow-up with Dr. Matthew Martinezcare previously.  His previous catheter from 2021 at which time he received a stent to the PDA.  His echo at that time showed ejection fraction of 60 to 65%.  Findings were based on coronary calcium score to 48.  He had a stress test in 2022 which was negative for ischemia. Asymptomatic today.  Occasionally has symptoms and may require nitroglycerin as well as aspirin.  He is fairly aware of his medical history.    Cardiac catheterization 5/21/2021: Successful FENG of the PDA, mild LAD and RCA disease.   Echocardiogram 2021: Ejection fraction 60 to 65%.   Cardiac CTA 2021: Coronary calcium score 248.   Holter monitor demonstrates sinus rhythm, sinus bradycardia as well as SVT. Low heart rate is 51. High heart rate is 187.   MPI 3/23/2022: Normal perfusion, ejection fraction 54%.   1 week Holter monitor May 2022 with short runs of atrial tachycardia, otherwise sinus bradycardia to sinus tachycardia.  5/4/2023 , LDL 63, HDL 48, triglycerides 183.  6/9/2023 ECG: Normal sinus rhythm, left axis deviation.  7/31/2023 MPI: Normal perfusion, ejection fraction 63%.   3/12/2024 , LDL 82, HDL 51, triglycerides 98.    Past Medical History:  He has a past medical history of HL (hearing loss), Pain in right knee (10/12/2021), and Personal history of other mental and behavioral disorders (07/25/2022).    Past Surgical History:  He has a past surgical history that includes Hernia repair (01/15/2016); Vasectomy (01/15/2016); Lithotripsy  "(01/15/2016); Colectomy (01/15/2016); Other surgical history (06/08/2021); Other surgical history (04/29/2021); Other surgical history (06/09/2017); and Tonsillectomy (06/09/2017).      Social History:  He reports that he has never smoked. He has never used smokeless tobacco. He reports current alcohol use. He reports that he does not use drugs.    Family History:  Family History[1]     Allergies:  Zolpidem, Cymbalta [duloxetine], Erythromycin, Hydroxyzine, Calamine-zinc oxide, and Trazodone    Outpatient Medications:  Current Outpatient Medications   Medication Instructions    atorvastatin (LIPITOR) 80 mg, oral, Daily    ciprofloxacin (CIPRO) 500 mg, oral, 2 times daily    clopidogrel (PLAVIX) 75 mg, oral, Daily    escitalopram (Lexapro) 20 mg tablet 1 tablet, Daily (0630)    gabapentin (NEURONTIN) 100 mg, oral, 3 times daily    influenza vac high-dose quad (Fluzone HighDose Quad 20-21 PF) syringe syringe 0.7 mL, Once    losartan (COZAAR) 100 mg, oral, Daily    melatonin 10 mg, Nightly PRN    mv,Ca,min-iron-FA-lycopene (Centrum Ultra Men's) 8 mg iron- 200 mcg-600 mcg tablet 1 tablet, Daily    nitroglycerin (NITROSTAT) 0.4 mg, sublingual, Every 5 min PRN    omeprazole OTC (PRILOSEC OTC) 20 mg, Daily        Last Recorded Vitals:  Vitals:    07/14/25 1532   BP: 126/82   BP Location: Left arm   Patient Position: Sitting   BP Cuff Size: Adult   Pulse: 84   SpO2: 96%   Weight: 90.8 kg (200 lb 3.2 oz)   Height: 1.778 m (5' 10\")       Physical Exam:  General: No acute distress,  A&O x3  Skin: Warm and dry  Neck: JVD is not elevated  ENT: Moist mucous membranes no lesions appreciated  Pulmonary: CTAB  Cards: Regular rate rhythm, no murmurs gallops or rubs appreciated normal S1-S2  Abdomen: Soft nontender nondistended  Extremities: No edema or cyanosis  Psych: Appropriate mood and affect     @PESEC->PESEC(CIRCULAR REFERENCE)@       Last Labs:  CBC -  Lab Results   Component Value Date    WBC 6.1 05/06/2025    HGB 14.8 " 05/06/2025    HCT 44.9 05/06/2025    MCV 90.5 05/06/2025     05/06/2025       CMP -  Lab Results   Component Value Date    CALCIUM 9.3 05/06/2025    PROT 7.0 05/06/2025    ALBUMIN 4.4 05/06/2025    AST 21 05/06/2025    ALT 31 05/06/2025    ALKPHOS 79 05/06/2025    BILITOT 0.7 05/06/2025       LIPID PANEL -   Lab Results   Component Value Date    CHOL 143 05/06/2025    TRIG 89 05/06/2025    HDL 49 05/06/2025    CHHDL 2.9 05/06/2025    LDLF 63 05/04/2023    VLDL 20 03/12/2024    NHDL 94 05/06/2025       RENAL FUNCTION PANEL -   Lab Results   Component Value Date    GLUCOSE 94 05/06/2025     05/06/2025    K 4.1 05/06/2025     05/06/2025    CO2 28 05/06/2025    ANIONGAP 9 05/06/2025    BUN 18 05/06/2025    CREATININE 1.02 05/06/2025    GFRMALE 67 07/12/2023    CALCIUM 9.3 05/06/2025    ALBUMIN 4.4 05/06/2025        Lab Results   Component Value Date    BNP 32 07/12/2023    HGBA1C 5.5 05/06/2025       Last Cardiology Tests:  ECG:  ECG 12 lead 10/07/2024      Assessment/Plan     1.  CAD: Prior drug-eluting stent to the PDA in 2021.  Calcium score 248.  Normal LV function EF of 60 to 65%.  Asymptomatic today.  No angina.  - Continue Clopidogrel 75 mg daily  Continue atorvastatin 80 mg daily  - As needed nitroglycerin    2.  Hypertension: Blood pressures are reasonably controlled today with current medical therapy.  Continue losartan 100 mg once a day.    3.  Hyperlipidemia: Most recent LDL 77.  Continue atorvastatin 80 mg once a day.  Advised a heart healthy plant-based diet and exercise.    (This note was generated with voice recognition software and may contain errors including spelling, grammar, syntax and missed recognition of what was dictated, of which may not have been fully corrected)     Rhett Jung MD PhD       [1] No family history on file.

## 2025-07-14 NOTE — TELEPHONE ENCOUNTER
Called patient to give him his results Pre Message from Nancy , Results were giving Patient understood

## 2025-07-14 NOTE — TELEPHONE ENCOUNTER
----- Message from Nancy Rodriguez sent at 7/14/2025 11:15 AM EDT -----  As per your call , you are reporting diarrhea.   Sent ciprofloxacin 500 mg 2 times daily for 7 days.  Hold on atorvastatin for 7 days- until  you are on antibiotic.  ----- Message -----  From: Melanie [facility]sy[facility] Results In  Sent: 7/11/2025  10:17 AM EDT  To: Nancy Rodriguez, GRACE-CNP

## 2025-07-25 LAB
CRYPTOSP AG STL QL IA: NORMAL
G LAMBLIA AG STL QL IA: NORMAL
O+P STL TRI STN: NORMAL

## 2025-07-29 DIAGNOSIS — F41.9 ANXIETY: Primary | ICD-10-CM

## 2025-07-29 RX ORDER — ESCITALOPRAM OXALATE 20 MG/1
20 TABLET ORAL DAILY
Qty: 90 TABLET | Refills: 1 | Status: SHIPPED | OUTPATIENT
Start: 2025-07-29

## 2025-08-01 DIAGNOSIS — I25.10 CORONARY ARTERY DISEASE INVOLVING NATIVE HEART, UNSPECIFIED VESSEL OR LESION TYPE, UNSPECIFIED WHETHER ANGINA PRESENT: ICD-10-CM

## 2025-08-01 RX ORDER — CLOPIDOGREL BISULFATE 75 MG/1
75 TABLET ORAL DAILY
Qty: 90 TABLET | Refills: 0 | Status: SHIPPED | OUTPATIENT
Start: 2025-08-01

## 2025-08-07 ENCOUNTER — APPOINTMENT (OUTPATIENT)
Dept: GASTROENTEROLOGY | Facility: CLINIC | Age: 75
End: 2025-08-07
Payer: MEDICARE

## 2025-08-10 DIAGNOSIS — I10 ESSENTIAL (PRIMARY) HYPERTENSION: ICD-10-CM

## 2025-08-11 RX ORDER — LOSARTAN POTASSIUM 100 MG/1
100 TABLET ORAL DAILY
Qty: 90 TABLET | Refills: 0 | Status: SHIPPED | OUTPATIENT
Start: 2025-08-11

## 2025-11-10 ENCOUNTER — APPOINTMENT (OUTPATIENT)
Dept: PRIMARY CARE | Facility: CLINIC | Age: 75
End: 2025-11-10
Payer: MEDICARE